# Patient Record
Sex: MALE | Race: WHITE | NOT HISPANIC OR LATINO | Employment: FULL TIME | URBAN - METROPOLITAN AREA
[De-identification: names, ages, dates, MRNs, and addresses within clinical notes are randomized per-mention and may not be internally consistent; named-entity substitution may affect disease eponyms.]

---

## 2017-06-12 ENCOUNTER — HOSPITAL ENCOUNTER (EMERGENCY)
Facility: CLINIC | Age: 47
Discharge: HOME | End: 2017-06-12
Attending: EMERGENCY MEDICINE | Admitting: EMERGENCY MEDICINE
Payer: COMMERCIAL

## 2017-06-12 ENCOUNTER — APPOINTMENT (OUTPATIENT)
Dept: CT IMAGING | Facility: CLINIC | Age: 47
End: 2017-06-12
Attending: EMERGENCY MEDICINE
Payer: COMMERCIAL

## 2017-06-12 VITALS
DIASTOLIC BLOOD PRESSURE: 78 MMHG | SYSTOLIC BLOOD PRESSURE: 134 MMHG | OXYGEN SATURATION: 95 % | TEMPERATURE: 98.4 F | RESPIRATION RATE: 16 BRPM | HEART RATE: 86 BPM

## 2017-06-12 DIAGNOSIS — J18.9 PNEUMONIA OF LEFT LOWER LOBE DUE TO INFECTIOUS ORGANISM: ICD-10-CM

## 2017-06-12 DIAGNOSIS — R91.8 PULMONARY NODULES: ICD-10-CM

## 2017-06-12 DIAGNOSIS — R07.1 PAINFUL RESPIRATION: ICD-10-CM

## 2017-06-12 LAB
ALBUMIN SERPL-MCNC: 3.6 G/DL (ref 3.4–5)
ALBUMIN UR-MCNC: NEGATIVE MG/DL
ALP SERPL-CCNC: 109 U/L (ref 40–150)
ALT SERPL W P-5'-P-CCNC: 74 U/L (ref 0–70)
ANION GAP SERPL CALCULATED.3IONS-SCNC: 5 MMOL/L (ref 3–14)
APPEARANCE UR: CLEAR
AST SERPL W P-5'-P-CCNC: 41 U/L (ref 0–45)
BASOPHILS # BLD AUTO: 0 10E9/L (ref 0–0.2)
BASOPHILS NFR BLD AUTO: 0.3 %
BILIRUB SERPL-MCNC: 2.2 MG/DL (ref 0.2–1.3)
BILIRUB UR QL STRIP: NEGATIVE
BUN SERPL-MCNC: 14 MG/DL (ref 7–30)
CALCIUM SERPL-MCNC: 8.6 MG/DL (ref 8.5–10.1)
CHLORIDE SERPL-SCNC: 104 MMOL/L (ref 94–109)
CO2 SERPL-SCNC: 29 MMOL/L (ref 20–32)
COLOR UR AUTO: YELLOW
CREAT SERPL-MCNC: 1.11 MG/DL (ref 0.66–1.25)
D DIMER PPP FEU-MCNC: 0.5 UG/ML FEU (ref 0–0.5)
DIFFERENTIAL METHOD BLD: NORMAL
EOSINOPHIL # BLD AUTO: 0.1 10E9/L (ref 0–0.7)
EOSINOPHIL NFR BLD AUTO: 0.6 %
ERYTHROCYTE [DISTWIDTH] IN BLOOD BY AUTOMATED COUNT: 13.2 % (ref 10–15)
GFR SERPL CREATININE-BSD FRML MDRD: 71 ML/MIN/1.7M2
GLUCOSE SERPL-MCNC: 130 MG/DL (ref 70–99)
GLUCOSE UR STRIP-MCNC: 50 MG/DL
HCT VFR BLD AUTO: 42.9 % (ref 40–53)
HGB BLD-MCNC: 15.6 G/DL (ref 13.3–17.7)
HGB UR QL STRIP: NEGATIVE
IMM GRANULOCYTES # BLD: 0 10E9/L (ref 0–0.4)
IMM GRANULOCYTES NFR BLD: 0.4 %
KETONES UR STRIP-MCNC: 20 MG/DL
LEUKOCYTE ESTERASE UR QL STRIP: NEGATIVE
LIPASE SERPL-CCNC: 123 U/L (ref 73–393)
LYMPHOCYTES # BLD AUTO: 2 10E9/L (ref 0.8–5.3)
LYMPHOCYTES NFR BLD AUTO: 21.9 %
MCH RBC QN AUTO: 32.2 PG (ref 26.5–33)
MCHC RBC AUTO-ENTMCNC: 36.4 G/DL (ref 31.5–36.5)
MCV RBC AUTO: 89 FL (ref 78–100)
MONOCYTES # BLD AUTO: 0.6 10E9/L (ref 0–1.3)
MONOCYTES NFR BLD AUTO: 6.5 %
MUCOUS THREADS #/AREA URNS LPF: PRESENT /LPF
NEUTROPHILS # BLD AUTO: 6.5 10E9/L (ref 1.6–8.3)
NEUTROPHILS NFR BLD AUTO: 70.3 %
NITRATE UR QL: NEGATIVE
NRBC # BLD AUTO: 0 10*3/UL
NRBC BLD AUTO-RTO: 0 /100
PH UR STRIP: 6 PH (ref 5–7)
PLATELET # BLD AUTO: 222 10E9/L (ref 150–450)
POTASSIUM SERPL-SCNC: 3.5 MMOL/L (ref 3.4–5.3)
PROT SERPL-MCNC: 7.3 G/DL (ref 6.8–8.8)
RBC # BLD AUTO: 4.85 10E12/L (ref 4.4–5.9)
RBC #/AREA URNS AUTO: <1 /HPF (ref 0–2)
SODIUM SERPL-SCNC: 138 MMOL/L (ref 133–144)
SP GR UR STRIP: 1.03 (ref 1–1.03)
TROPONIN I SERPL-MCNC: NORMAL UG/L (ref 0–0.04)
URN SPEC COLLECT METH UR: ABNORMAL
UROBILINOGEN UR STRIP-MCNC: 4 MG/DL (ref 0–2)
WBC # BLD AUTO: 9.3 10E9/L (ref 4–11)
WBC #/AREA URNS AUTO: <1 /HPF (ref 0–2)

## 2017-06-12 PROCEDURE — 74177 CT ABD & PELVIS W/CONTRAST: CPT

## 2017-06-12 PROCEDURE — 83690 ASSAY OF LIPASE: CPT | Performed by: EMERGENCY MEDICINE

## 2017-06-12 PROCEDURE — 96361 HYDRATE IV INFUSION ADD-ON: CPT

## 2017-06-12 PROCEDURE — 25000128 H RX IP 250 OP 636: Performed by: EMERGENCY MEDICINE

## 2017-06-12 PROCEDURE — 96374 THER/PROPH/DIAG INJ IV PUSH: CPT

## 2017-06-12 PROCEDURE — 80053 COMPREHEN METABOLIC PANEL: CPT | Performed by: EMERGENCY MEDICINE

## 2017-06-12 PROCEDURE — 71260 CT THORAX DX C+: CPT

## 2017-06-12 PROCEDURE — 84484 ASSAY OF TROPONIN QUANT: CPT | Performed by: EMERGENCY MEDICINE

## 2017-06-12 PROCEDURE — 81001 URINALYSIS AUTO W/SCOPE: CPT | Performed by: EMERGENCY MEDICINE

## 2017-06-12 PROCEDURE — 25000132 ZZH RX MED GY IP 250 OP 250 PS 637: Performed by: EMERGENCY MEDICINE

## 2017-06-12 PROCEDURE — 25000125 ZZHC RX 250: Performed by: EMERGENCY MEDICINE

## 2017-06-12 PROCEDURE — 85025 COMPLETE CBC W/AUTO DIFF WBC: CPT | Performed by: EMERGENCY MEDICINE

## 2017-06-12 PROCEDURE — 85379 FIBRIN DEGRADATION QUANT: CPT | Performed by: EMERGENCY MEDICINE

## 2017-06-12 PROCEDURE — 93005 ELECTROCARDIOGRAM TRACING: CPT

## 2017-06-12 PROCEDURE — 99285 EMERGENCY DEPT VISIT HI MDM: CPT | Mod: 25

## 2017-06-12 RX ORDER — FENTANYL CITRATE 50 UG/ML
50 INJECTION, SOLUTION INTRAMUSCULAR; INTRAVENOUS ONCE
Status: COMPLETED | OUTPATIENT
Start: 2017-06-12 | End: 2017-06-12

## 2017-06-12 RX ORDER — HYDROCODONE BITARTRATE AND ACETAMINOPHEN 5; 325 MG/1; MG/1
1-2 TABLET ORAL EVERY 6 HOURS PRN
Qty: 12 TABLET | Refills: 0 | Status: SHIPPED | OUTPATIENT
Start: 2017-06-12 | End: 2020-11-15

## 2017-06-12 RX ORDER — HYDROCODONE BITARTRATE AND ACETAMINOPHEN 5; 325 MG/1; MG/1
2 TABLET ORAL ONCE
Status: COMPLETED | OUTPATIENT
Start: 2017-06-12 | End: 2017-06-12

## 2017-06-12 RX ORDER — AZITHROMYCIN 250 MG/1
TABLET, FILM COATED ORAL
Qty: 6 TABLET | Refills: 0 | Status: SHIPPED | OUTPATIENT
Start: 2017-06-12 | End: 2017-07-06

## 2017-06-12 RX ORDER — IOPAMIDOL 755 MG/ML
500 INJECTION, SOLUTION INTRAVASCULAR ONCE
Status: COMPLETED | OUTPATIENT
Start: 2017-06-12 | End: 2017-06-12

## 2017-06-12 RX ORDER — KETOROLAC TROMETHAMINE 15 MG/ML
15 INJECTION, SOLUTION INTRAMUSCULAR; INTRAVENOUS ONCE
Status: DISCONTINUED | OUTPATIENT
Start: 2017-06-12 | End: 2017-06-13 | Stop reason: HOSPADM

## 2017-06-12 RX ORDER — FENTANYL CITRATE 50 UG/ML
25 INJECTION, SOLUTION INTRAMUSCULAR; INTRAVENOUS ONCE
Status: DISCONTINUED | OUTPATIENT
Start: 2017-06-12 | End: 2017-06-12

## 2017-06-12 RX ADMIN — SODIUM CHLORIDE 1000 ML: 9 INJECTION, SOLUTION INTRAVENOUS at 20:36

## 2017-06-12 RX ADMIN — IOPAMIDOL 67 ML: 755 INJECTION, SOLUTION INTRAVENOUS at 21:39

## 2017-06-12 RX ADMIN — FENTANYL CITRATE 50 MCG: 50 INJECTION INTRAMUSCULAR; INTRAVENOUS at 20:36

## 2017-06-12 RX ADMIN — SODIUM CHLORIDE 89 ML: 9 INJECTION, SOLUTION INTRAVENOUS at 21:40

## 2017-06-12 RX ADMIN — HYDROCODONE BITARTRATE AND ACETAMINOPHEN 2 TABLET: 5; 325 TABLET ORAL at 20:19

## 2017-06-12 NOTE — ED AVS SNAPSHOT
[facility] Emergency Department    [address]    [facility] [address]    Phone:  [phone]    Fax:  [phone]                                       Dakotah Zheng   MRN: [id number]    Department:  [facility] Emergency Department   Date of Visit:  6/12/2017           After Visit Summary Signature Page     I have received my discharge instructions, and my questions have been answered. I have discussed any challenges I see with this plan with the nurse or doctor.    ..........................................................................................................................................  Patient/Patient Representative Signature      ..........................................................................................................................................  Patient Representative Print Name and Relationship to Patient    ..................................................               ................................................  Date                                            Time    ..........................................................................................................................................  Reviewed by Signature/Title    ...................................................              ..............................................  Date                                                            Time

## 2017-06-12 NOTE — ED NOTES
Patient comes in for evaluation of pain in the left ribs which started on Wednesday. He denies any injury. Has been taking advil at home and it helps some but it wears off. ABCs intact.

## 2017-06-12 NOTE — ED AVS SNAPSHOT
[facility] Emergency Department    [address] [place] [address]    Phone:  [phone]    Fax:  [phone]                                       Dakotah Zheng   MRN: [id number]    Department:  [facility] Emergency Department   Date of Visit:  6/12/2017           Patient Information     Date Of Birth          1970        Your diagnoses for this visit were:     Painful respiration     Pneumonia of left lower lobe due to infectious organism     Pulmonary nodules        You were seen by Steve Garcia MD.        Discharge Instructions       Please make an appointment to follow up with your primary care provider in 2-3 days if not improving.      You also need to see your Primary Doctor in 3 months for a repeat Lung CT to see if the nodule in your lung is changing    Discharge Instructions  Bronchitis, Pneumonia, Bronchospasm    You were seen today for a chest infection or inflammation. If your doctor decided this was due to a bacterial infection, you may need an antibiotic. Sometimes these are caused by a virus, and then an antibiotic will not help.     Return to the Emergency Department if:    Your breathing gets much worse.    You are very weak, or feel much more ill.    You develop new symptoms, such as chest pain.    You cough up blood.    You are vomiting enough that you can t keep fluids or your medicine down.    What can I do to help myself?    Fill any prescriptions the doctor gave you and take them right away--especially antibiotics. Be sure to finish the whole antibiotic prescription.    You may be given a prescription for an inhaler, which can help loosen tight air passages.  Use this as needed, but not more often than directed. Inhalers work much better when used with a spacer.     You may be given a prescription for a steroid to reduce inflammation. Used long-term, these can have many serious side effects, but for short courses these do not  "happen. You may notice restlessness or increased appetite.        You may use non-prescription cough or cold medicines. Cough medicines may help, but don t make the cough go away completely.     Avoid smoke, because this can make your symptoms worse. If you smoke, this may be a good time to quit! Consider using nicotine lozenges, gum, or patches to reduce cravings.     If you have a fever, Tylenol  (acetaminophen), Motrin  (ibuprofen), or Advil  (ibuprofen) may help bring fever down and may help you feel more comfortable. Be sure to read and follow the package directions, and ask your doctor if you have questions.    Be sure to get your flu shot each year.  The pneumonia shot can help prevent pneumonia.  Probiotics: If you have been given an antibiotic, you may want to also take a probiotic pill or eat yogurt with live cultures. Probiotics have \"good bacteria\" to help your intestines stay healthy. Studies have shown that probiotics help prevent diarrhea and other intestine problems (including C. diff infection) when you take antibiotics. You can buy these without a prescription in the pharmacy section of the store.     If your doctor has told you to follow-up at your clinic, be sure to call right away and go to your appointment.  If there is any problem with keeping your appointment, call your doctor or return to the Emergency Department.    If you were given a prescription for medicine here today, be sure to read all of the information (including the package insert) that comes with your prescription.  This will include important information about the medicine, its side effects, and any warnings that you need to know about.  The pharmacist who fills the prescription can provide more information and answer questions you may have about the medicine.  If you have questions or concerns that the pharmacist cannot address, please call or return to the Emergency Department.       Remember that you can always come back to " the Emergency Department if you are not able to see your regular doctor in the amount of time listed above, if you get any new symptoms, or if there is anything that worries you.        Discharge Instructions  Chest Pain    You have been seen today for chest pain or discomfort.  At this time, your doctor has found no signs that your chest pain is due to a serious or life-threatening condition, (or you have declined more testing and/or admission to the hospital). However, sometimes there is a serious problem that does not show up right away. Your evaluation today may not be complete and you may need further testing and evaluation.     You need to follow-up with your regular doctor within 3 days.    Return to the Emergency Department if:    Your chest pain changes, gets worse, starts to happen more often, or comes with less activity.    You are short of breath.    You get very weak or tired.    You pass out or faint.    You have any new symptoms, like fever, cough, numb legs, or you cough up blood.    You have anything else that worries you.    Until you follow-up with your regular doctor please do the following:    Take one aspirin daily unless you have an allergy or are told not to by your doctor.    If a stress test appointment has been made, go to the appointment.    If you have questions, contact your regular doctor.    If your doctor today has told you to follow-up with your regular doctor, it is very important that you make an appointment with your clinic and go to the appointment.  If you do not follow-up with your primary doctor, it may result in missing an important development which could result in permanent injury or disability and/or lasting pain.  If there is any problem keeping your appointment, call your doctor or return to the Emergency Department.    If you were given a prescription for medicine here today, be sure to read all of the information (including the package insert) that comes with your  prescription.  This will include important information about the medicine, its side effects, and any warnings that you need to know about.  The pharmacist who fills the prescription can provide more information and answer questions you may have about the medicine.  If you have questions or concerns that the pharmacist cannot address, please call or return to the Emergency Department.     Opioid Medication Information    Pain medications are among the most commonly prescribed medicines, so we are including this information for all our patients. If you did not receive pain medication or get a prescription for pain medicine, you can ignore it.     You may have been given a prescription for an opioid (narcotic) pain medicine and/or have received a pain medicine while here in the Emergency Department. These medicines can make you drowsy or impaired. You must not drive, operate dangerous equipment, or engage in any other dangerous activities while taking these medications. If you drive while taking these medications, you could be arrested for DUI, or driving under the influence. Do not drink any alcohol while you are taking these medications.     Opioid pain medications can cause addiction. If you have a history of chemical dependency of any type, you are at a higher risk of becoming addicted to pain medications.  Only take these prescribed medications to treat your pain when all other options have been tried. Take it for as short a time and as few doses as possible. Store your pain pills in a secure place, as they are frequently stolen and provide a dangerous opportunity for children or visitors in your house to start abusing these powerful medications. We will not replace any lost or stolen medicine.  As soon as your pain is better, you should flush all your remaining medication.     Many prescription pain medications contain Tylenol  (acetaminophen), including Vicodin , Tylenol #3 , Norco , Lortab , and Percocet .  You  should not take any extra pills of Tylenol  if you are using these prescription medications or you can get very sick.  Do not ever take more than 3000 mg of acetaminophen in any 24 hour period.    All opioids tend to cause constipation. Drink plenty of water and eat foods that have a lot of fiber, such as fruits, vegetables, prune juice, apple juice and high fiber cereal.  Take a laxative if you don t move your bowels at least every other day. Miralax , Milk of Magnesia, Colace , or Senna  can be used to keep you regular.      Remember that you can always come back to the Emergency Department if you are not able to see your regular doctor in the amount of time listed above, if you get any new symptoms, or if there is anything that worries you.          24 Hour Appointment Hotline       To make an appointment at any [facility], call [phone] ([phone]). If you don't have a family doctor or clinic, we will help you find one. [facility] clinics are conveniently located to serve the needs of you and your family.             Review of your medicines      START taking        Dose / Directions Last dose taken    amoxicillin-clavulanate 875-125 MG per tablet   Commonly known as:  AUGMENTIN   Dose:  1 tablet   Quantity:  14 tablet        Take 1 tablet by mouth 2 times daily for 7 days   Refills:  0        azithromycin 250 MG tablet   Commonly known as:  ZITHROMAX Z-MARK   Quantity:  6 tablet        Two tablets on the first day, then one tablet daily for the next 4 days   Refills:  0        HYDROcodone-acetaminophen 5-325 MG per tablet   Commonly known as:  NORCO   Dose:  1-2 tablet   Quantity:  12 tablet        Take 1-2 tablets by mouth every 6 hours as needed for pain   Refills:  0          Our records show that you are taking the medicines listed below. If these are incorrect, please call your family doctor or clinic.        Dose / Directions Last dose taken    ADVIL PO        Refills:  0         CYCLOBENZAPRINE HCL PO        Refills:  0                Prescriptions were sent or printed at these locations (3 Prescriptions)                   Other Prescriptions                Printed at Department/Unit printer (3 of 3)         amoxicillin-clavulanate (AUGMENTIN) 875-125 MG per tablet               azithromycin (ZITHROMAX Z-MARK) 250 MG tablet               HYDROcodone-acetaminophen (NORCO) 5-325 MG per tablet                Procedures and tests performed during your visit     CBC with platelets differential    CT Chest/Abdomen/Pelvis w Contrast    Comprehensive metabolic panel    D dimer quantitative    EKG 12-lead, tracing only    Lipase    Troponin I    UA with Microscopic      Orders Needing Specimen Collection     None      Pending Results     No orders found from 6/10/2017 to 6/13/2017.            Pending Culture Results     No orders found from 6/10/2017 to 6/13/2017.            Pending Results Instructions     If you had any lab results that were not finalized at the time of your Discharge, you can call the ED Lab Result RN at [phone]. You will be contacted by this team for any positive Lab results or changes in treatment. The nurses are available 7 days a week from 10A to 6:30P.  You can leave a message 24 hours per day and they will return your call.        Test Results From Your Hospital Stay        6/12/2017  8:59 PM      Component Results     Component Value Ref Range & Units Status    WBC 9.3 4.0 - 11.0 10e9/L Final    RBC Count 4.85 4.4 - 5.9 10e12/L Final    Hemoglobin 15.6 13.3 - 17.7 g/dL Final    Hematocrit 42.9 40.0 - 53.0 % Final    MCV 89 78 - 100 fl Final    MCH 32.2 26.5 - 33.0 pg Final    MCHC 36.4 31.5 - 36.5 g/dL Final    RDW 13.2 10.0 - 15.0 % Final    Platelet Count 222 150 - 450 10e9/L Final    Diff Method Automated Method  Final    % Neutrophils 70.3 % Final    % Lymphocytes 21.9 % Final    % Monocytes 6.5 % Final    % Eosinophils 0.6 % Final    % Basophils 0.3 % Final    %  Immature Granulocytes 0.4 % Final    Nucleated RBCs 0 0 /100 Final    Absolute Neutrophil 6.5 1.6 - 8.3 10e9/L Final    Absolute Lymphocytes 2.0 0.8 - 5.3 10e9/L Final    Absolute Monocytes 0.6 0.0 - 1.3 10e9/L Final    Absolute Eosinophils 0.1 0.0 - 0.7 10e9/L Final    Absolute Basophils 0.0 0.0 - 0.2 10e9/L Final    Abs Immature Granulocytes 0.0 0 - 0.4 10e9/L Final    Absolute Nucleated RBC 0.0  Final         6/12/2017  8:38 PM      Component Results     Component Value Ref Range & Units Status    Sodium 138 133 - 144 mmol/L Final    Potassium 3.5 3.4 - 5.3 mmol/L Final    Chloride 104 94 - 109 mmol/L Final    Carbon Dioxide 29 20 - 32 mmol/L Final    Anion Gap 5 3 - 14 mmol/L Final    Glucose 130 (H) 70 - 99 mg/dL Final    Urea Nitrogen 14 7 - 30 mg/dL Final    Creatinine 1.11 0.66 - 1.25 mg/dL Final    GFR Estimate 71 >60 mL/min/1.7m2 Final    Non  GFR Calc    GFR Estimate If Black 86 >60 mL/min/1.7m2 Final    African American GFR Calc    Calcium 8.6 8.5 - 10.1 mg/dL Final    Bilirubin Total 2.2 (H) 0.2 - 1.3 mg/dL Final    Albumin 3.6 3.4 - 5.0 g/dL Final    Protein Total 7.3 6.8 - 8.8 g/dL Final    Alkaline Phosphatase 109 40 - 150 U/L Final    ALT 74 (H) 0 - 70 U/L Final    AST 41 0 - 45 U/L Final         6/12/2017  8:38 PM      Component Results     Component Value Ref Range & Units Status    Lipase 123 73 - 393 U/L Final         6/12/2017  8:40 PM      Component Results     Component Value Ref Range & Units Status    Troponin I ES  0.000 - 0.045 ug/L Final    <0.015  The 99th percentile for upper reference range is 0.045 ug/L.  Troponin values in   the range of 0.045 - 0.120 ug/L may be associated with risks of adverse   clinical events.           6/12/2017  8:28 PM      Component Results     Component Value Ref Range & Units Status    D Dimer 0.5 0.0 - 0.50 ug/ml FEU Final    This D-dimer assay is intended for use in conjuntion with a clinical pretest   probability assessment model to  exclude pulmonary embolism (PE) and as an aid   in the diagnosis of deep venous thrombosis (DVT) in outpatients suspected of   PE   or DVT. The cut-off value is 0.5 g/mL FEU.           6/12/2017 10:13 PM      Component Results     Component Value Ref Range & Units Status    Color Urine Yellow  Final    Appearance Urine Clear  Final    Glucose Urine 50 (A) NEG mg/dL Final    Bilirubin Urine Negative NEG Final    Ketones Urine 20 (A) NEG mg/dL Final    Specific Gravity Urine 1.026 1.003 - 1.035 Final    Blood Urine Negative NEG Final    pH Urine 6.0 5.0 - 7.0 pH Final    Protein Albumin Urine Negative NEG mg/dL Final    Urobilinogen mg/dL 4.0 (H) 0.0 - 2.0 mg/dL Final    Nitrite Urine Negative NEG Final    Leukocyte Esterase Urine Negative NEG Final    Source Midstream Urine  Final    WBC Urine <1 0 - 2 /HPF Final    RBC Urine <1 0 - 2 /HPF Final    Mucous Urine Present (A) NEG /LPF Final         6/12/2017  9:57 PM      Narrative     CT CHEST/ABDOMEN/PELVIS W CONTRAST 6/12/2017 9:49 PM    HISTORY: Right lower chest and flank pain.    COMPARISON: None.    TECHNIQUE:  Chest, abdomen and pelvis CT was performed following  intravenous administration of 67 cc Isovue-370.  Radiation dose for  this scan was reduced using automated exposure control, adjustment of  the mA and/or kV according to patient size, or iterative  reconstruction technique.    FINDINGS:     CHEST: No pulmonary embolus. No evidence of aortic dissection. Mild  cardiomegaly. No adenopathy. 1.1 cm pulmonary nodule in the left lung  (image 58, series 6). Lingular atelectasis. Patchy left basilar  consolidation. No significant pleural effusion.    ABDOMEN: Liver, gallbladder, spleen, pancreas, adrenal glands and  right kidney are unremarkable. Left kidney is atrophic.    Pelvis: Normal caliber bowel. Colonic diverticulosis without  diverticulitis. Appendix is visualized and normal. No free air. No  free or loculated intraperitoneal fluid collection. Urinary  bladder is  distended with normal wall thickness.        Impression     IMPRESSION:   1. No acute vascular abnormality.  2. Left lung pulmonary nodule measures 1.1 cm. Follow-up chest CT is  recommended in 3 months.  3. Patchy left basilar consolidation may represent atelectasis or  pneumonia.  4. No acute abnormality in the abdomen or pelvis.    PER BARBA MD                Clinical Quality Measure: Blood Pressure Screening     Your blood pressure was checked while you were in the emergency department today. The last reading we obtained was  BP: 137/90 . Please read the guidelines below about what these numbers mean and what you should do about them.  If your systolic blood pressure (the top number) is less than 120 and your diastolic blood pressure (the bottom number) is less than 80, then your blood pressure is normal. There is nothing more that you need to do about it.  If your systolic blood pressure (the top number) is 120-139 or your diastolic blood pressure (the bottom number) is 80-89, your blood pressure may be higher than it should be. You should have your blood pressure rechecked within a year by a primary care provider.  If your systolic blood pressure (the top number) is 140 or greater or your diastolic blood pressure (the bottom number) is 90 or greater, you may have high blood pressure. High blood pressure is treatable, but if left untreated over time it can put you at risk for heart attack, stroke, or kidney failure. You should have your blood pressure rechecked by a primary care provider within the next 4 weeks.  If your provider in the emergency department today gave you specific instructions to follow-up with your doctor or provider even sooner than that, you should follow that instruction and not wait for up to 4 weeks for your follow-up visit.        Thank you for choosing [facility]       Thank you for choosing [facility] for your care. Our goal is always to provide you with excellent care.  "Hearing back from our patients is one way we can continue to improve our services. Please take a few minutes to complete the written survey that you may receive in the mail after you visit with us. Thank you!        [employer] Information     [employer] lets you send messages to your doctor, view your test results, renew your prescriptions, schedule appointments and more. To sign up, go to [email or web address] . Click on \"Log in\" on the left side of the screen, which will take you to the Welcome page. Then click on \"Sign up Now\" on the right side of the page.     You will be asked to enter the access code listed below, as well as some personal information. Please follow the directions to create your username and password.     Your access code is: [email or web address]  Expires: 9/10/2017 10:40 PM     Your access code will  in 90 days. If you need help or a new code, please call your [facility] clinic or [phone].        Care EveryWhere ID     This is your Care EveryWhere ID. This could be used by other organizations to access your [facility] medical records  [id number]        After Visit Summary       This is your record. Keep this with you and show to your community pharmacist(s) and doctor(s) at your next visit.                  "

## 2017-06-12 NOTE — LETTER
[facility] EMERGENCY DEPARTMENT  [address]  [facility] [address]  [phone]    2017    Dakotah Zheng  [address]  [place] [address]  [phone] (home) none (work)    : 1970      To Whom it may concern:    Dakotah Zheng was seen in our Emergency Department today, 2017.  I expect his condition to improve over the next 2 days.  He may return to work/school when improved.    Sincerely,    [facility]

## 2017-06-13 LAB
INTERPRETATION ECG - MUSE: NORMAL
INTERPRETATION ECG - MUSE: NORMAL

## 2017-06-13 NOTE — ED NOTES
Patient states worse of pain waves comes and goes. Pt holding left rib and holding breath. Pt states pain currently 10/10. MD advised.

## 2017-06-13 NOTE — DISCHARGE INSTRUCTIONS
Please make an appointment to follow up with your primary care provider in 2-3 days if not improving.      You also need to see your Primary Doctor in 3 months for a repeat Lung CT to see if the nodule in your lung is changing    Discharge Instructions  Bronchitis, Pneumonia, Bronchospasm    You were seen today for a chest infection or inflammation. If your doctor decided this was due to a bacterial infection, you may need an antibiotic. Sometimes these are caused by a virus, and then an antibiotic will not help.     Return to the Emergency Department if:    Your breathing gets much worse.    You are very weak, or feel much more ill.    You develop new symptoms, such as chest pain.    You cough up blood.    You are vomiting enough that you can t keep fluids or your medicine down.    What can I do to help myself?    Fill any prescriptions the doctor gave you and take them right away--especially antibiotics. Be sure to finish the whole antibiotic prescription.    You may be given a prescription for an inhaler, which can help loosen tight air passages.  Use this as needed, but not more often than directed. Inhalers work much better when used with a spacer.     You may be given a prescription for a steroid to reduce inflammation. Used long-term, these can have many serious side effects, but for short courses these do not happen. You may notice restlessness or increased appetite.        You may use non-prescription cough or cold medicines. Cough medicines may help, but don t make the cough go away completely.     Avoid smoke, because this can make your symptoms worse. If you smoke, this may be a good time to quit! Consider using nicotine lozenges, gum, or patches to reduce cravings.     If you have a fever, Tylenol  (acetaminophen), Motrin  (ibuprofen), or Advil  (ibuprofen) may help bring fever down and may help you feel more comfortable. Be sure to read and follow the package directions, and ask your doctor if you have  "questions.    Be sure to get your flu shot each year.  The pneumonia shot can help prevent pneumonia.  Probiotics: If you have been given an antibiotic, you may want to also take a probiotic pill or eat yogurt with live cultures. Probiotics have \"good bacteria\" to help your intestines stay healthy. Studies have shown that probiotics help prevent diarrhea and other intestine problems (including C. diff infection) when you take antibiotics. You can buy these without a prescription in the pharmacy section of the store.     If your doctor has told you to follow-up at your clinic, be sure to call right away and go to your appointment.  If there is any problem with keeping your appointment, call your doctor or return to the Emergency Department.    If you were given a prescription for medicine here today, be sure to read all of the information (including the package insert) that comes with your prescription.  This will include important information about the medicine, its side effects, and any warnings that you need to know about.  The pharmacist who fills the prescription can provide more information and answer questions you may have about the medicine.  If you have questions or concerns that the pharmacist cannot address, please call or return to the Emergency Department.       Remember that you can always come back to the Emergency Department if you are not able to see your regular doctor in the amount of time listed above, if you get any new symptoms, or if there is anything that worries you.        Discharge Instructions  Chest Pain    You have been seen today for chest pain or discomfort.  At this time, your doctor has found no signs that your chest pain is due to a serious or life-threatening condition, (or you have declined more testing and/or admission to the hospital). However, sometimes there is a serious problem that does not show up right away. Your evaluation today may not be complete and you may need " further testing and evaluation.     You need to follow-up with your regular doctor within 3 days.    Return to the Emergency Department if:    Your chest pain changes, gets worse, starts to happen more often, or comes with less activity.    You are short of breath.    You get very weak or tired.    You pass out or faint.    You have any new symptoms, like fever, cough, numb legs, or you cough up blood.    You have anything else that worries you.    Until you follow-up with your regular doctor please do the following:    Take one aspirin daily unless you have an allergy or are told not to by your doctor.    If a stress test appointment has been made, go to the appointment.    If you have questions, contact your regular doctor.    If your doctor today has told you to follow-up with your regular doctor, it is very important that you make an appointment with your clinic and go to the appointment.  If you do not follow-up with your primary doctor, it may result in missing an important development which could result in permanent injury or disability and/or lasting pain.  If there is any problem keeping your appointment, call your doctor or return to the Emergency Department.    If you were given a prescription for medicine here today, be sure to read all of the information (including the package insert) that comes with your prescription.  This will include important information about the medicine, its side effects, and any warnings that you need to know about.  The pharmacist who fills the prescription can provide more information and answer questions you may have about the medicine.  If you have questions or concerns that the pharmacist cannot address, please call or return to the Emergency Department.     Opioid Medication Information    Pain medications are among the most commonly prescribed medicines, so we are including this information for all our patients. If you did not receive pain medication or get a  prescription for pain medicine, you can ignore it.     You may have been given a prescription for an opioid (narcotic) pain medicine and/or have received a pain medicine while here in the Emergency Department. These medicines can make you drowsy or impaired. You must not drive, operate dangerous equipment, or engage in any other dangerous activities while taking these medications. If you drive while taking these medications, you could be arrested for DUI, or driving under the influence. Do not drink any alcohol while you are taking these medications.     Opioid pain medications can cause addiction. If you have a history of chemical dependency of any type, you are at a higher risk of becoming addicted to pain medications.  Only take these prescribed medications to treat your pain when all other options have been tried. Take it for as short a time and as few doses as possible. Store your pain pills in a secure place, as they are frequently stolen and provide a dangerous opportunity for children or visitors in your house to start abusing these powerful medications. We will not replace any lost or stolen medicine.  As soon as your pain is better, you should flush all your remaining medication.     Many prescription pain medications contain Tylenol  (acetaminophen), including Vicodin , Tylenol #3 , Norco , Lortab , and Percocet .  You should not take any extra pills of Tylenol  if you are using these prescription medications or you can get very sick.  Do not ever take more than 3000 mg of acetaminophen in any 24 hour period.    All opioids tend to cause constipation. Drink plenty of water and eat foods that have a lot of fiber, such as fruits, vegetables, prune juice, apple juice and high fiber cereal.  Take a laxative if you don t move your bowels at least every other day. Miralax , Milk of Magnesia, Colace , or Senna  can be used to keep you regular.      Remember that you can always come back to the Emergency  Department if you are not able to see your regular doctor in the amount of time listed above, if you get any new symptoms, or if there is anything that worries you.

## 2017-06-13 NOTE — ED PROVIDER NOTES
[facility] Provider Note  [facility] Emergency Department  1:00 AM  6/13/2017    Dakotah Zheng  46 year oldmale    Chief Complaint   Patient presents with     Rib Pain       HPI:    46-year-old Indonesian here with left-sided chest pain intermittent over the last 5 days sharp and pressure-like in nature.  Pain comes in waves which has no identifiable pattern not tied to eating or exertion.  It is a mild cough but no fever cough is nonproductive no sore throat or sinus congestion.  No nausea vomiting diarrhea dysuria.  He has no known heart or lung disease.  No history of recent travel or calf pain or swelling.  Area of pain is located just inferior and lateral to the left breast in the anterior axillary line.    ROS: 10 point ROS completed and negative other than mentioned above    No past medical history on file.  Past Surgical History:   Procedure Laterality Date     TONSILLECTOMY ADULT  06/16/2015     Family History   Problem Relation Age of Onset     DIABETES Mother      DIABETES Maternal Grandmother      Social History     Social History     Marital status:      Spouse name: N/A     Number of children: N/A     Years of education: N/A     Occupational History     Not on file.     Social History Main Topics     Smoking status: Never Smoker     Smokeless tobacco: Not on file     Alcohol use No     Drug use: No     Sexual activity: Not on file     Other Topics Concern     Not on file     Social History Narrative     Current Facility-Administered Medications   Medication     ketorolac (TORADOL) injection 15 mg     Current Outpatient Prescriptions   Medication     CYCLOBENZAPRINE HCL PO     Ibuprofen (ADVIL PO)     amoxicillin-clavulanate (AUGMENTIN) 875-125 MG per tablet     azithromycin (ZITHROMAX Z-MARK) 250 MG tablet     HYDROcodone-acetaminophen (NORCO) 5-325 MG per tablet      No Known Allergies      Physical Exam  Vitals: /78  Pulse 86  Temp 98.4  F (36.9  C) (Temporal)  Resp 16  SpO2 95%  Gen: well  appearing for stated age and in no acute distress  HEENT: Atraumatic, conjunctiva and lids normal, external ear unremarkable, Nares clear bilaterally, mmm, oropharynx without tonsilar hypertrophy/erythema/exudate  Neck: supple, painless ROM, no cervical lymphadenopathy  Lungs:  CTAB,  no resp distress, tenderness, left lower chest wall and inferior costal margin anterior axillary line no palpable crepitus or deformity  CV: rrr, no m/r/g, ppi  Abd: soft, nontender, nondistended, no rebound/masses/guarding/hsm  Ext: no peripheral edema  Skin: warm, dry, well perused, no rashes/bruising/lesions on exposed skin  Neuro: alert, nonfocal gait stable  Psych: Normal mood, normal affect    Labs and Imaging:    See Epic for full report    Medical Decision Makin-year-old, here with intermittent pleuritic chest pain for the last 5 days localizing to the inferior costal margin.  Differential includes both cardiopulmonary and intra-abdominal etiologies.  Workup here shows no significant cardiac etiology from EKG and troponin which given his history and duration of symptoms as well as the pain being constant since this morning and a negative troponin the context of clinical suspicion makes this very unlikely to be occlusive coronary disease.  CT scan was done to evaluate for PE pneumonia as well as a left renal stone and this shows a possible left lower lobe pneumonia he is not hypoxic he is a good candidate for outpatient treatment and will treat as such.  He is comfortable and agreeable with that plan.  We also discussed the pulmonary nodule seen on CT scan of the importance of follow-up in 3 months with another CT and he was comfortable with that.    Diagnosis:    ICD-10-CM    1. Painful respiration R07.1    2. Pneumonia of left lower lobe due to infectious organism J18.9    3. Pulmonary nodules R91.8        Disposition:    Home    Steve Garcia MD  [facility]  [facility]                     Steve Garcia  MD [place]  06/13/17 0104

## 2017-06-13 NOTE — ED NOTES
Pt ABCs intact and A&Ox4. Discharge instructions completed. Prescriptions provided. Pain improved and tolerable per patient.

## 2017-07-06 ENCOUNTER — APPOINTMENT (OUTPATIENT)
Dept: ULTRASOUND IMAGING | Facility: CLINIC | Age: 47
End: 2017-07-06
Attending: EMERGENCY MEDICINE
Payer: COMMERCIAL

## 2017-07-06 ENCOUNTER — HOSPITAL ENCOUNTER (EMERGENCY)
Facility: CLINIC | Age: 47
Discharge: HOME | End: 2017-07-06
Attending: EMERGENCY MEDICINE | Admitting: EMERGENCY MEDICINE
Payer: COMMERCIAL

## 2017-07-06 VITALS
HEIGHT: 65 IN | WEIGHT: 160 LBS | DIASTOLIC BLOOD PRESSURE: 88 MMHG | TEMPERATURE: 98.3 F | RESPIRATION RATE: 16 BRPM | HEART RATE: 70 BPM | OXYGEN SATURATION: 98 % | BODY MASS INDEX: 26.66 KG/M2 | SYSTOLIC BLOOD PRESSURE: 125 MMHG

## 2017-07-06 DIAGNOSIS — R10.13 ABDOMINAL PAIN, EPIGASTRIC: ICD-10-CM

## 2017-07-06 LAB
ALBUMIN SERPL-MCNC: 4.1 G/DL (ref 3.4–5)
ALBUMIN UR-MCNC: NEGATIVE MG/DL
ALP SERPL-CCNC: 131 U/L (ref 40–150)
ALT SERPL W P-5'-P-CCNC: 102 U/L (ref 0–70)
ANION GAP SERPL CALCULATED.3IONS-SCNC: 7 MMOL/L (ref 3–14)
APPEARANCE UR: CLEAR
AST SERPL W P-5'-P-CCNC: 96 U/L (ref 0–45)
BILIRUB SERPL-MCNC: 3.3 MG/DL (ref 0.2–1.3)
BILIRUB UR QL STRIP: NEGATIVE
BUN SERPL-MCNC: 10 MG/DL (ref 7–30)
CALCIUM SERPL-MCNC: 9.1 MG/DL (ref 8.5–10.1)
CHLORIDE SERPL-SCNC: 104 MMOL/L (ref 94–109)
CO2 SERPL-SCNC: 29 MMOL/L (ref 20–32)
COLOR UR AUTO: YELLOW
CREAT SERPL-MCNC: 1.18 MG/DL (ref 0.66–1.25)
ERYTHROCYTE [DISTWIDTH] IN BLOOD BY AUTOMATED COUNT: 13.2 % (ref 10–15)
GFR SERPL CREATININE-BSD FRML MDRD: 67 ML/MIN/1.7M2
GLUCOSE SERPL-MCNC: 128 MG/DL (ref 70–99)
GLUCOSE UR STRIP-MCNC: NEGATIVE MG/DL
HCT VFR BLD AUTO: 48.6 % (ref 40–53)
HGB BLD-MCNC: 18.2 G/DL (ref 13.3–17.7)
HGB UR QL STRIP: NEGATIVE
KETONES UR STRIP-MCNC: NEGATIVE MG/DL
LEUKOCYTE ESTERASE UR QL STRIP: NEGATIVE
LIPASE SERPL-CCNC: 172 U/L (ref 73–393)
MCH RBC QN AUTO: 33 PG (ref 26.5–33)
MCHC RBC AUTO-ENTMCNC: 37.4 G/DL (ref 31.5–36.5)
MCV RBC AUTO: 88 FL (ref 78–100)
MUCOUS THREADS #/AREA URNS LPF: PRESENT /LPF
NITRATE UR QL: NEGATIVE
PH UR STRIP: 6 PH (ref 5–7)
PLATELET # BLD AUTO: 230 10E9/L (ref 150–450)
POTASSIUM SERPL-SCNC: 3.5 MMOL/L (ref 3.4–5.3)
PROT SERPL-MCNC: 7.9 G/DL (ref 6.8–8.8)
RBC # BLD AUTO: 5.52 10E12/L (ref 4.4–5.9)
RBC #/AREA URNS AUTO: 2 /HPF (ref 0–2)
SODIUM SERPL-SCNC: 140 MMOL/L (ref 133–144)
SP GR UR STRIP: 1.02 (ref 1–1.03)
URN SPEC COLLECT METH UR: ABNORMAL
UROBILINOGEN UR STRIP-MCNC: 4 MG/DL (ref 0–2)
WBC # BLD AUTO: 10 10E9/L (ref 4–11)
WBC #/AREA URNS AUTO: 1 /HPF (ref 0–2)

## 2017-07-06 PROCEDURE — 76705 ECHO EXAM OF ABDOMEN: CPT

## 2017-07-06 PROCEDURE — 25000132 ZZH RX MED GY IP 250 OP 250 PS 637: Performed by: EMERGENCY MEDICINE

## 2017-07-06 PROCEDURE — 81001 URINALYSIS AUTO W/SCOPE: CPT | Performed by: EMERGENCY MEDICINE

## 2017-07-06 PROCEDURE — 25000125 ZZHC RX 250: Performed by: EMERGENCY MEDICINE

## 2017-07-06 PROCEDURE — 80053 COMPREHEN METABOLIC PANEL: CPT | Performed by: EMERGENCY MEDICINE

## 2017-07-06 PROCEDURE — 85027 COMPLETE CBC AUTOMATED: CPT | Performed by: EMERGENCY MEDICINE

## 2017-07-06 PROCEDURE — 99284 EMERGENCY DEPT VISIT MOD MDM: CPT | Mod: 25

## 2017-07-06 PROCEDURE — 83690 ASSAY OF LIPASE: CPT | Performed by: EMERGENCY MEDICINE

## 2017-07-06 RX ADMIN — LIDOCAINE HYDROCHLORIDE 30 ML: 20 SOLUTION ORAL; TOPICAL at 17:25

## 2017-07-06 ASSESSMENT — ENCOUNTER SYMPTOMS
VOMITING: 1
ABDOMINAL PAIN: 1

## 2017-07-06 NOTE — ED AVS SNAPSHOT
[facility] Emergency Department    [address]    [facility] [address]    Phone:  [phone]    Fax:  [phone]                                       Dakotah Zheng   MRN: [id number]    Department:  [facility] Emergency Department   Date of Visit:  7/6/2017           After Visit Summary Signature Page     I have received my discharge instructions, and my questions have been answered. I have discussed any challenges I see with this plan with the nurse or doctor.    ..........................................................................................................................................  Patient/Patient Representative Signature      ..........................................................................................................................................  Patient Representative Print Name and Relationship to Patient    ..................................................               ................................................  Date                                            Time    ..........................................................................................................................................  Reviewed by Signature/Title    ...................................................              ..............................................  Date                                                            Time

## 2017-07-06 NOTE — ED PROVIDER NOTES
"  History     Chief Complaint:  Abdominal Pain    HPI   Dakotah Zheng is a 46 year old male who presents to the emergency department today for evaluation of abdominal pain. The patient reports that when he woke up this morning he felt completely normal. At 1030 he ate a sandwich and then at 1230 he suddenly developed abdominal pain. The patient reports that he vomited three times at 1330 and at 1430 he took half a tablet of Vicodin and this relieved his pain slightly. The patient reports that he has had abdominal pain similar to this in the past, the last time being in the spring of 2016. He still has his gallbladder. The patient notes that he drinks a lot of Diet Coke.     Allergies:  No Known Drug Allergies    Medications:    Ibuprofen  Norco    Past Medical History:    History reviewed. No pertinent past medical history.    Past Surgical History:    Tonsillectomy     Family History:    Mother: Diabetes   Maternal Grandmother: Diabetes     Social History:  Smoking Status: Never Smoker  Alcohol Use: Negative   Marital Status:   [2]     Review of Systems   Gastrointestinal: Positive for abdominal pain and vomiting.   All other systems reviewed and are negative.    Physical Exam   Vitals:  Patient Vitals for the past 24 hrs:   BP Temp Temp src Pulse Heart Rate Resp SpO2 Height Weight   07/06/17 1840 - - - - - - 95 % - -   07/06/17 1836 - - - - - - 95 % - -   07/06/17 1815 - - - - - - 96 % - -   07/06/17 1800 - - - - - - 96 % - -   07/06/17 1744 - - - - - - 97 % - -   07/06/17 1742 - - - - - - 97 % - -   07/06/17 1740 - - - - - - 96 % - -   07/06/17 1721 - - - - - - 97 % - -   07/06/17 1720 - - - - - - 97 % - -   07/06/17 1717 - - - - - - 98 % - -   07/06/17 1716 (!) 136/91 - - - - - - - -   07/06/17 1536 141/90 98.3  F (36.8  C) Oral 70 70 16 97 % 1.651 m (5' 5\") 72.6 kg (160 lb)      Physical Exam  General: Patient is alert and interactive when I enter the room  Head:  The scalp, face, and head appear " normal  Eyes:  The pupils are equal, round, and reactive to light    Conjunctivae and sclerae are normal  ENT:    External acoustic canals are normal    The oropharynx is normal without erythema.     Uvula is in the midline  Neck:  Normal range of motion  CV:  Regular rate. S1/S2. No murmurs.   Resp:  Lungs are clear without wheezes or rales. No distress  GI:  Epigastric tenderness no rebound and no guarding     No distension.    MS:  Normal tone. Joints grossly normal without effusions.     No asymmetric leg swelling, calf or thigh tenderness.      Normal motor assessment of all extremities.  Skin:  No rash or lesions noted. Normal capillary refill noted  Neuro:  Speech is normal and fluent. Face is symmetric.     Moving all extremities well.   Psych:  Awake. Alert.  Normal affect.  Appropriate interactions.  Lymph: No anterior cervical lymphadenopathy noted    Emergency Department Course     Imaging:  Radiology findings were communicated with the patient who voiced understanding of the findings.    US Abdomen Limited  1. Cholelithiasis.   2. Fatty liver.    Reading per radiology    Laboratory:  Laboratory findings were communicated with the patient who voiced understanding of the findings.    UA: Urobilinogen: 4.0 (H), Mucous: Present (A)  CBC: WBC 10.0, HGB 18.2 (H),   CMP: Glucose 128 (H), Bilirubin 3.3 (H),  (H), AST 96 (H) o/w WNL (Creatinine 1.18)  Lipase: 172     Component      Latest Ref Rng & Units 3/15/2016 6/23/2016 7/13/2016 6/12/2017   Sodium      133 - 144 mmol/L 137 139 140 138   Potassium      3.4 - 5.3 mmol/L 3.9 3.5 3.4 3.5   Chloride      94 - 109 mmol/L 100 104 101 104   Carbon Dioxide      20 - 32 mmol/L 34 (H) 31 31 29   Anion Gap      3 - 14 mmol/L 3 4 8 5   Glucose      70 - 99 mg/dL 183 (H) 115 (H) 130 (H) 130 (H)   Urea Nitrogen      7 - 30 mg/dL 18 8 13 14   Creatinine      0.66 - 1.25 mg/dL 1.11 1.15 1.13 1.11   GFR Estimate      >60 mL/min/1.7m2 72 69 70 71   GFR Estimate  If Black      >60 mL/min/1.7m2 87 83 85 86   Calcium      8.5 - 10.1 mg/dL 8.7 8.3 (L) 8.7 8.6   Bilirubin Total      0.2 - 1.3 mg/dL 3.3 (H) 4.4 (H) 1.4 (H) 2.2 (H)   Albumin      3.4 - 5.0 g/dL 4.2 3.5 3.9 3.6   Protein Total      6.8 - 8.8 g/dL 8.2 7.1 7.8 7.3   Alkaline Phosphatase      40 - 150 U/L 128 197 (H) 116 109   ALT      0 - 70 U/L 284 (H) 685 (HH) 91 (H) 74 (H)   AST      0 - 45 U/L 305 (H) 320 (H) 98 (H) 41     Component      Latest Ref Rng & Units 7/6/2017   Sodium      133 - 144 mmol/L 140   Potassium      3.4 - 5.3 mmol/L 3.5   Chloride      94 - 109 mmol/L 104   Carbon Dioxide      20 - 32 mmol/L 29   Anion Gap      3 - 14 mmol/L 7   Glucose      70 - 99 mg/dL 128 (H)   Urea Nitrogen      7 - 30 mg/dL 10   Creatinine      0.66 - 1.25 mg/dL 1.18   GFR Estimate      >60 mL/min/1.7m2 67   GFR Estimate If Black      >60 mL/min/1.7m2 81   Calcium      8.5 - 10.1 mg/dL 9.1   Bilirubin Total      0.2 - 1.3 mg/dL 3.3 (H)   Albumin      3.4 - 5.0 g/dL 4.1   Protein Total      6.8 - 8.8 g/dL 7.9   Alkaline Phosphatase      40 - 150 U/L 131   ALT      0 - 70 U/L 102 (H)   AST      0 - 45 U/L 96 (H)       Interventions:  1725 GI Cocktail (Maalox/Mylanta and viscous Lidocaine), 30 mL suspension, PO      Emergency Department Course:  Nursing notes and vitals reviewed.  I performed an exam of the patient as documented above.   The patient was sent for a US Abdomen Limited while in the emergency department, results above.   IV was inserted and blood was drawn for laboratory testing, results above.  The patient provided a urine sample here in the emergency department. This was sent for laboratory testing, findings above.  I discussed the treatment plan with the patient. They expressed understanding of this plan and consented to discharge. They will be discharged home with instructions for care and follow up. In addition, the patient will return to the emergency department if their symptoms persist, worsen, if new  symptoms arise or if there is any concern.  All questions were answered.  I personally reviewed the laboratory and imaging results with the patient and answered all related questions prior to discharge.  Impression & Plan      Medical Decision Making:   Dakotah Zheng is a 46 year old male who presents with abdominal pain.  He has hx of this; etiology is unclear despite extensive workups.  Hyperbili is baseline; unclear etiology but suspected Kaplan in the past. He looks overall well and without a concerning etiology of abdominal pain.  A broad differential diagnosis was of course considered.    The workup in the ED is at this point negative.  No etiology for the patients pain is found at this point and my suspicion of an intraabdominal catastrophe or other worrisome etiology is very low.   ultrasound and lab work are reassuring.    I will not therefore admit him for serial exams and further workup.  Patient is hemodynamically stable in ED. Plan is home with abdominal pain recheck by primary care physician or return to ED at anytime.  Return for fevers greater than 102, increasing pain, other new symptoms develop.  Abdominal pain handout given.  Questions were answered.       Diagnosis:    ICD-10-CM    1. Abdominal pain, epigastric R10.13      Disposition:   The patient is discharged to home.    Discharge Medications:  New Prescriptions    RANITIDINE (ZANTAC) 150 MG TABLET    Take 1 tablet (150 mg) by mouth 2 times daily for 7 days     Scribe Disclosure:  I, Devan Knox, am serving as a scribe at 5:16 PM on 7/6/2017 to document services personally performed by Behzad Bal MD, based on my observations and the provider's statements to me.    [facility] EMERGENCY DEPARTMENT       Behzad Bal MD  07/06/17 9910

## 2017-07-06 NOTE — ED AVS SNAPSHOT
[facility] Emergency Department    [address] [place] [address]    Phone:  [phone]    Fax:  [phone]                                       Dakotah Zheng   MRN: [id number]    Department:  [facility] Emergency Department   Date of Visit:  7/6/2017           Patient Information     Date Of Birth          1970        Your diagnoses for this visit were:     Abdominal pain, epigastric        You were seen by Behzad Bal MD.      Follow-up Information     Follow up with Ilda Art In 3 days.    Contact information:    [facility]  [address] [place] [address]  [phone]          Discharge Instructions         Epigastric Pain (Uncertain Cause)    Epigastric pain can be a sign of disease in the upper abdomen. Common causes include:    Acid reflux (stomach acid flowing up into the esophagus)    Gastritis (irritation of the stomach lining)    Peptic Ulcer Disease    Inflammation of the pancreas    Gallstone    Infection in the gallbladder  Pain may be dull or burning. It may spread upward to the chest or to the back. There may be other symptoms such as belching, bloating, cramps or hunger pains. There may be weight loss or poor appetite, nausea or vomiting.  Since the diagnosis of your pain is not certain yet, further tests may sometimes be needed. Sometimes the doctor will treat you for the most likely condition to see if there is improvement before doing further tests.  Home care  Medicines    Antacids help neutralize the normal acids in your stomach. Examples are Maalox, Mylanta, Rolaids, and Tums. If you don t like the liquid, you can also try a chewable one. You may find one works better than another for you. Overuse can cause diarrhea or constipation.    Acid blockers (H2 blockers) decrease acid production. Examples are cimetidine (Tagamet), famotidine (Pepcid) and ranitidine (Zantac).    Acid inhibitors (PPIs) decrease acid  production in a different way than the blockers. You may find they work better, but can take a little longer to take effect.  Examples are omeprazole (Prilosec), lansoprazole (Prevacid), pantoprazole (Protonix), rabeprazole (Aciphex), and esomeprazole (Nexium).    Take an antacid 30-60 minutes after eating and at bedtime, but not at the same time as an acid blocker.    Try not to take NSAIDs. Aspirin may also cause problems, but if taking it for your heart or other medical reasons, talk to your doctor before stopping it; you do not want to cause a worse problem, like a heart attack or stroke.  Diet    If certain foods seem to cause your spasm, try to avoid them.     Eat slowly and chew food well before swallowing. Symptoms of gastritis can be worsened by certain foods. Limit or avoid fatty, fried, and spicy foods, as well as coffee, chocolate, mint, and foods with high acid content such as tomatoes and citrus fruit and juices (orange, grapefruit, lemon).    Avoid alcohol, caffeine, and tobacco, which can delay healing and worsen your problem.    Try eating smaller meals with snacks in between  Follow-up care  Follow up with your healthcare provider or as advised.  When to seek medical advice  Call your healthcare provider right away if any of the following occur:    Stomach pain worsens or moves to the right lower part of the abdomen    Chest pain appears, or if it worsens or spreads to the chest, back, neck, shoulder, or arm    Frequent vomiting (can t keep down liquids)    Blood in the stool or vomit (red or black color)    Feeling weak or dizzy, fainting, or having trouble breathing    Fever of 100.4 F (38 C) or higher, or as directed by your healthcare provider    Abdominal swelling  Date Last Reviewed: 9/25/2015 2000-2017 [employer]. [address] [place] [address], [place] [address]. All rights reserved. This information is not intended as a substitute for professional medical care. Always follow your  healthcare professional's instructions.          24 Hour Appointment Hotline       To make an appointment at any [facility], call [phone] ([phone]). If you don't have a family doctor or clinic, we will help you find one. [place] clinics are conveniently located to serve the needs of you and your family.             Review of your medicines      START taking        Dose / Directions Last dose taken    ranitidine 150 MG tablet   Commonly known as:  ZANTAC   Dose:  150 mg   Quantity:  14 tablet        Take 1 tablet (150 mg) by mouth 2 times daily for 7 days   Refills:  0          Our records show that you are taking the medicines listed below. If these are incorrect, please call your family doctor or clinic.        Dose / Directions Last dose taken    ADVIL PO        Refills:  0        HYDROcodone-acetaminophen 5-325 MG per tablet   Commonly known as:  NORCO   Dose:  1-2 tablet   Quantity:  12 tablet        Take 1-2 tablets by mouth every 6 hours as needed for pain   Refills:  0                Prescriptions were sent or printed at these locations (1 Prescription)                   Other Prescriptions                Printed at Department/Unit printer (1 of 1)         ranitidine (ZANTAC) 150 MG tablet                Procedures and tests performed during your visit     CBC (platelets, no diff)    Comprehensive metabolic panel    Lipase    UA with Microscopic reflex to Culture    US Abdomen Limited      Orders Needing Specimen Collection     None      Pending Results     Date and Time Order Name Status Description    7/6/2017 1756 US Abdomen Limited Preliminary             Pending Culture Results     No orders found from 7/4/2017 to 7/7/2017.            Pending Results Instructions     If you had any lab results that were not finalized at the time of your Discharge, you can call the ED Lab Result RN at [phone]. You will be contacted by this team for any positive Lab results or changes in treatment.  The nurses are available 7 days a week from 10A to 6:30P.  You can leave a message 24 hours per day and they will return your call.        Test Results From Your Hospital Stay        7/6/2017  4:08 PM      Component Results     Component Value Ref Range & Units Status    WBC 10.0 4.0 - 11.0 10e9/L Final    RBC Count 5.52 4.4 - 5.9 10e12/L Final    Hemoglobin 18.2 (H) 13.3 - 17.7 g/dL Final    Hematocrit 48.6 40.0 - 53.0 % Final    MCV 88 78 - 100 fl Final    MCH 33.0 26.5 - 33.0 pg Final    MCHC 37.4 (H) 31.5 - 36.5 g/dL Final    RDW 13.2 10.0 - 15.0 % Final    Platelet Count 230 150 - 450 10e9/L Final         7/6/2017  4:26 PM      Component Results     Component Value Ref Range & Units Status    Sodium 140 133 - 144 mmol/L Final    Potassium 3.5 3.4 - 5.3 mmol/L Final    Chloride 104 94 - 109 mmol/L Final    Carbon Dioxide 29 20 - 32 mmol/L Final    Anion Gap 7 3 - 14 mmol/L Final    Glucose 128 (H) 70 - 99 mg/dL Final    Urea Nitrogen 10 7 - 30 mg/dL Final    Creatinine 1.18 0.66 - 1.25 mg/dL Final    GFR Estimate 67 >60 mL/min/1.7m2 Final    Non  GFR Calc    GFR Estimate If Black 81 >60 mL/min/1.7m2 Final    African American GFR Calc    Calcium 9.1 8.5 - 10.1 mg/dL Final    Bilirubin Total 3.3 (H) 0.2 - 1.3 mg/dL Final    Albumin 4.1 3.4 - 5.0 g/dL Final    Protein Total 7.9 6.8 - 8.8 g/dL Final    Alkaline Phosphatase 131 40 - 150 U/L Final     (H) 0 - 70 U/L Final    AST 96 (H) 0 - 45 U/L Final         7/6/2017  5:34 PM      Component Results     Component Value Ref Range & Units Status    Color Urine Yellow  Final    Appearance Urine Clear  Final    Glucose Urine Negative NEG mg/dL Final    Bilirubin Urine Negative NEG Final    Ketones Urine Negative NEG mg/dL Final    Specific Gravity Urine 1.017 1.003 - 1.035 Final    Blood Urine Negative NEG Final    pH Urine 6.0 5.0 - 7.0 pH Final    Protein Albumin Urine Negative NEG mg/dL Final    Urobilinogen mg/dL 4.0 (H) 0.0 - 2.0 mg/dL Final     Nitrite Urine Negative NEG Final    Leukocyte Esterase Urine Negative NEG Final    Source Midstream Urine  Final    WBC Urine 1 0 - 2 /HPF Final    RBC Urine 2 0 - 2 /HPF Final    Mucous Urine Present (A) NEG /LPF Final         7/6/2017  5:15 PM      Component Results     Component Value Ref Range & Units Status    Lipase 172 73 - 393 U/L Final         7/6/2017  7:05 PM      Narrative     US ABDOMEN LIMITED7/6/2017 7:00 PM     HISTORY: Abdominal pain, evaluate for cholecystitis, CBD dilation.    COMPARISON:  6/23/2016.    FINDINGS: Findings consistent with fatty liver infiltration. There is  gallbladder sludge and tiny gallstones. No wall thickening. Negative  sonographic Garcia's sign. No focal hepatic lesion or common bile duct  dilatation. The pancreas is significantly obscured. The right kidney  is unremarkable.        Impression     IMPRESSION:   1. Cholelithiasis.   2. Fatty liver.                  Clinical Quality Measure: Blood Pressure Screening     Your blood pressure was checked while you were in the emergency department today. The last reading we obtained was  BP: (!) 136/91 . Please read the guidelines below about what these numbers mean and what you should do about them.  If your systolic blood pressure (the top number) is less than 120 and your diastolic blood pressure (the bottom number) is less than 80, then your blood pressure is normal. There is nothing more that you need to do about it.  If your systolic blood pressure (the top number) is 120-139 or your diastolic blood pressure (the bottom number) is 80-89, your blood pressure may be higher than it should be. You should have your blood pressure rechecked within a year by a primary care provider.  If your systolic blood pressure (the top number) is 140 or greater or your diastolic blood pressure (the bottom number) is 90 or greater, you may have high blood pressure. High blood pressure is treatable, but if left untreated over time it can put  "you at risk for heart attack, stroke, or kidney failure. You should have your blood pressure rechecked by a primary care provider within the next 4 weeks.  If your provider in the emergency department today gave you specific instructions to follow-up with your doctor or provider even sooner than that, you should follow that instruction and not wait for up to 4 weeks for your follow-up visit.        Thank you for choosing [facility]       Thank you for choosing [facility] for your care. Our goal is always to provide you with excellent care. Hearing back from our patients is one way we can continue to improve our services. Please take a few minutes to complete the written survey that you may receive in the mail after you visit with us. Thank you!        [employer] game platform Information     [employer] lets you send messages to your doctor, view your test results, renew your prescriptions, schedule appointments and more. To sign up, go to [email or web address] . Click on \"Log in\" on the left side of the screen, which will take you to the Welcome page. Then click on \"Sign up Now\" on the right side of the page.     You will be asked to enter the access code listed below, as well as some personal information. Please follow the directions to create your username and password.     Your access code is: [email or web address]  Expires: 9/10/2017 10:40 PM     Your access code will  in 90 days. If you need help or a new code, please call your [facility] clinic or [phone].        Care EveryWhere ID     This is your Care EveryWhere ID. This could be used by other organizations to access your [facility] medical records  [id number]        Equal Access to Services     POLI JONES : Hadii annel griffin Soebonie, waaxda luqadaha, qaybta kaalmapérez tamez . So [facility] [phone].    ATENCIÓN: Si habla español, tiene a huynh disposición servicios gratuitos de asistencia lingüística. Llame al [phone].    We " comply with applicable federal civil rights laws and [place] laws. We do not discriminate on the basis of race, color, national origin, age, disability sex, sexual orientation or gender identity.            After Visit Summary       This is your record. Keep this with you and show to your community pharmacist(s) and doctor(s) at your next visit.

## 2017-07-06 NOTE — ED NOTES
Mid-abdominal pain that began today at 1230.  Vomiting x3 times at 1330.  Took Vicodin 1/2 tab at 1430 and that relieved some of the pain.  Patient alert and oriented x3.  Airway, breathing and circulation intact.

## 2017-07-07 NOTE — DISCHARGE INSTRUCTIONS
Epigastric Pain (Uncertain Cause)    Epigastric pain can be a sign of disease in the upper abdomen. Common causes include:    Acid reflux (stomach acid flowing up into the esophagus)    Gastritis (irritation of the stomach lining)    Peptic Ulcer Disease    Inflammation of the pancreas    Gallstone    Infection in the gallbladder  Pain may be dull or burning. It may spread upward to the chest or to the back. There may be other symptoms such as belching, bloating, cramps or hunger pains. There may be weight loss or poor appetite, nausea or vomiting.  Since the diagnosis of your pain is not certain yet, further tests may sometimes be needed. Sometimes the doctor will treat you for the most likely condition to see if there is improvement before doing further tests.  Home care  Medicines    Antacids help neutralize the normal acids in your stomach. Examples are Maalox, Mylanta, Rolaids, and Tums. If you don t like the liquid, you can also try a chewable one. You may find one works better than another for you. Overuse can cause diarrhea or constipation.    Acid blockers (H2 blockers) decrease acid production. Examples are cimetidine (Tagamet), famotidine (Pepcid) and ranitidine (Zantac).    Acid inhibitors (PPIs) decrease acid production in a different way than the blockers. You may find they work better, but can take a little longer to take effect.  Examples are omeprazole (Prilosec), lansoprazole (Prevacid), pantoprazole (Protonix), rabeprazole (Aciphex), and esomeprazole (Nexium).    Take an antacid 30-60 minutes after eating and at bedtime, but not at the same time as an acid blocker.    Try not to take NSAIDs. Aspirin may also cause problems, but if taking it for your heart or other medical reasons, talk to your doctor before stopping it; you do not want to cause a worse problem, like a heart attack or stroke.  Diet    If certain foods seem to cause your spasm, try to avoid them.     Eat slowly and chew food well  before swallowing. Symptoms of gastritis can be worsened by certain foods. Limit or avoid fatty, fried, and spicy foods, as well as coffee, chocolate, mint, and foods with high acid content such as tomatoes and citrus fruit and juices (orange, grapefruit, lemon).    Avoid alcohol, caffeine, and tobacco, which can delay healing and worsen your problem.    Try eating smaller meals with snacks in between  Follow-up care  Follow up with your healthcare provider or as advised.  When to seek medical advice  Call your healthcare provider right away if any of the following occur:    Stomach pain worsens or moves to the right lower part of the abdomen    Chest pain appears, or if it worsens or spreads to the chest, back, neck, shoulder, or arm    Frequent vomiting (can t keep down liquids)    Blood in the stool or vomit (red or black color)    Feeling weak or dizzy, fainting, or having trouble breathing    Fever of 100.4 F (38 C) or higher, or as directed by your healthcare provider    Abdominal swelling  Date Last Reviewed: 9/25/2015 2000-2017 [employer]. [address] [place] [address], [place] [address]. All rights reserved. This information is not intended as a substitute for professional medical care. Always follow your healthcare professional's instructions.

## 2020-11-15 ENCOUNTER — APPOINTMENT (OUTPATIENT)
Dept: GENERAL RADIOLOGY | Facility: CLINIC | Age: 50
End: 2020-11-15
Attending: EMERGENCY MEDICINE
Payer: MEDICAID

## 2020-11-15 ENCOUNTER — HOSPITAL ENCOUNTER (INPATIENT)
Facility: CLINIC | Age: 50
LOS: 7 days | Discharge: HOME | End: 2020-11-22
Attending: EMERGENCY MEDICINE | Admitting: INTERNAL MEDICINE
Payer: MEDICAID

## 2020-11-15 DIAGNOSIS — R05.9 COUGH: ICD-10-CM

## 2020-11-15 DIAGNOSIS — J12.82 PNEUMONIA DUE TO COVID-19 VIRUS: ICD-10-CM

## 2020-11-15 DIAGNOSIS — U07.1 PNEUMONIA DUE TO COVID-19 VIRUS: ICD-10-CM

## 2020-11-15 DIAGNOSIS — R06.02 SHORTNESS OF BREATH: Primary | ICD-10-CM

## 2020-11-15 LAB
ABO + RH BLD: NORMAL
ABO + RH BLD: NORMAL
ALBUMIN SERPL-MCNC: 2 G/DL (ref 3.4–5)
ALP SERPL-CCNC: 193 U/L (ref 40–150)
ALT SERPL W P-5'-P-CCNC: 79 U/L (ref 0–70)
ANION GAP SERPL CALCULATED.3IONS-SCNC: 6 MMOL/L (ref 3–14)
APTT PPP: 28 SEC (ref 22–37)
AST SERPL W P-5'-P-CCNC: 96 U/L (ref 0–45)
BASE EXCESS BLDV CALC-SCNC: 3.3 MMOL/L
BASOPHILS # BLD AUTO: 0.1 10E9/L (ref 0–0.2)
BASOPHILS NFR BLD AUTO: 1 %
BILIRUB DIRECT SERPL-MCNC: 0.3 MG/DL (ref 0–0.2)
BILIRUB SERPL-MCNC: 0.9 MG/DL (ref 0.2–1.3)
BUN SERPL-MCNC: 17 MG/DL (ref 7–30)
CALCIUM SERPL-MCNC: 8.2 MG/DL (ref 8.5–10.1)
CHLORIDE SERPL-SCNC: 102 MMOL/L (ref 94–109)
CK SERPL-CCNC: 120 U/L (ref 30–300)
CO2 SERPL-SCNC: 29 MMOL/L (ref 20–32)
CREAT SERPL-MCNC: 1.11 MG/DL (ref 0.66–1.25)
CRP SERPL-MCNC: 58 MG/L (ref 0–8)
D DIMER PPP FEU-MCNC: 4.2 UG/ML FEU (ref 0–0.5)
D DIMER PPP FEU-MCNC: 4.2 UG/ML FEU (ref 0–0.5)
DIFFERENTIAL METHOD BLD: ABNORMAL
EOSINOPHIL # BLD AUTO: 0 10E9/L (ref 0–0.7)
EOSINOPHIL NFR BLD AUTO: 0 %
ERYTHROCYTE [DISTWIDTH] IN BLOOD BY AUTOMATED COUNT: 12.6 % (ref 10–15)
FERRITIN SERPL-MCNC: 987 NG/ML (ref 26–388)
FIBRINOGEN PPP-MCNC: 655 MG/DL (ref 200–420)
GFR SERPL CREATININE-BSD FRML MDRD: 78 ML/MIN/{1.73_M2}
GLUCOSE SERPL-MCNC: 145 MG/DL (ref 70–99)
HCO3 BLDV-SCNC: 28 MMOL/L (ref 21–28)
HCT VFR BLD AUTO: 47.8 % (ref 40–53)
HGB BLD-MCNC: 16.9 G/DL (ref 13.3–17.7)
IMM PLASMA CELLS NFR BLD: 1 %
INR PPP: 1.09 (ref 0.86–1.14)
LACTATE BLD-SCNC: 1.3 MMOL/L (ref 0.7–2)
LDH SERPL L TO P-CCNC: 325 U/L (ref 85–227)
LYMPHOCYTES # BLD AUTO: 1.3 10E9/L (ref 0.8–5.3)
LYMPHOCYTES NFR BLD AUTO: 18 %
MAGNESIUM SERPL-MCNC: 2.2 MG/DL (ref 1.6–2.3)
MCH RBC QN AUTO: 32 PG (ref 26.5–33)
MCHC RBC AUTO-ENTMCNC: 35.4 G/DL (ref 31.5–36.5)
MCV RBC AUTO: 91 FL (ref 78–100)
METAMYELOCYTES # BLD: 0.1 10E9/L
METAMYELOCYTES NFR BLD MANUAL: 1 %
MONOCYTES # BLD AUTO: 0.3 10E9/L (ref 0–1.3)
MONOCYTES NFR BLD AUTO: 4 %
NEUTROPHILS # BLD AUTO: 5.3 10E9/L (ref 1.6–8.3)
NEUTROPHILS NFR BLD AUTO: 75 %
O2/TOTAL GAS SETTING VFR VENT: NORMAL %
OXYHGB MFR BLDV: 76 %
PCO2 BLDV: 42 MM HG (ref 40–50)
PH BLDV: 7.43 PH (ref 7.32–7.43)
PLASMA CELLS # BLD MANUAL: 0.1 10E9/L
PLATELET # BLD AUTO: 402 10E9/L (ref 150–450)
PLATELET # BLD EST: ABNORMAL 10*3/UL
PO2 BLDV: 39 MM HG (ref 25–47)
POTASSIUM SERPL-SCNC: 3.1 MMOL/L (ref 3.4–5.3)
POTASSIUM SERPL-SCNC: 3.4 MMOL/L (ref 3.4–5.3)
PROCALCITONIN SERPL-MCNC: 0.21 NG/ML
PROT SERPL-MCNC: 6.4 G/DL (ref 6.8–8.8)
RBC # BLD AUTO: 5.28 10E12/L (ref 4.4–5.9)
RBC MORPH BLD: ABNORMAL
RETICS # AUTO: 100.3 10E9/L (ref 25–95)
RETICS/RBC NFR AUTO: 2.2 % (ref 0.5–2)
SODIUM SERPL-SCNC: 137 MMOL/L (ref 133–144)
SPECIMEN EXP DATE BLD: NORMAL
TROPONIN I SERPL-MCNC: <0.015 UG/L (ref 0–0.04)
WBC # BLD AUTO: 7.1 10E9/L (ref 4–11)

## 2020-11-15 PROCEDURE — 84132 ASSAY OF SERUM POTASSIUM: CPT | Performed by: PHYSICIAN ASSISTANT

## 2020-11-15 PROCEDURE — 36415 COLL VENOUS BLD VENIPUNCTURE: CPT | Performed by: EMERGENCY MEDICINE

## 2020-11-15 PROCEDURE — 84145 PROCALCITONIN (PCT): CPT | Performed by: EMERGENCY MEDICINE

## 2020-11-15 PROCEDURE — 120N000001 HC R&B MED SURG/OB

## 2020-11-15 PROCEDURE — 86901 BLOOD TYPING SEROLOGIC RH(D): CPT | Performed by: PHYSICIAN ASSISTANT

## 2020-11-15 PROCEDURE — 93005 ELECTROCARDIOGRAM TRACING: CPT

## 2020-11-15 PROCEDURE — 82728 ASSAY OF FERRITIN: CPT | Performed by: PHYSICIAN ASSISTANT

## 2020-11-15 PROCEDURE — 85379 FIBRIN DEGRADATION QUANT: CPT | Performed by: EMERGENCY MEDICINE

## 2020-11-15 PROCEDURE — 250N000011 HC RX IP 250 OP 636: Performed by: PHYSICIAN ASSISTANT

## 2020-11-15 PROCEDURE — 85730 THROMBOPLASTIN TIME PARTIAL: CPT | Performed by: PHYSICIAN ASSISTANT

## 2020-11-15 PROCEDURE — 85610 PROTHROMBIN TIME: CPT | Performed by: PHYSICIAN ASSISTANT

## 2020-11-15 PROCEDURE — 80048 BASIC METABOLIC PNL TOTAL CA: CPT | Performed by: EMERGENCY MEDICINE

## 2020-11-15 PROCEDURE — 82550 ASSAY OF CK (CPK): CPT | Performed by: PHYSICIAN ASSISTANT

## 2020-11-15 PROCEDURE — 86900 BLOOD TYPING SEROLOGIC ABO: CPT | Performed by: PHYSICIAN ASSISTANT

## 2020-11-15 PROCEDURE — 85379 FIBRIN DEGRADATION QUANT: CPT | Performed by: PHYSICIAN ASSISTANT

## 2020-11-15 PROCEDURE — 99285 EMERGENCY DEPT VISIT HI MDM: CPT | Mod: 25

## 2020-11-15 PROCEDURE — 250N000009 HC RX 250: Performed by: PHYSICIAN ASSISTANT

## 2020-11-15 PROCEDURE — 85025 COMPLETE CBC W/AUTO DIFF WBC: CPT | Performed by: EMERGENCY MEDICINE

## 2020-11-15 PROCEDURE — 83615 LACTATE (LD) (LDH) ENZYME: CPT | Performed by: PHYSICIAN ASSISTANT

## 2020-11-15 PROCEDURE — 85045 AUTOMATED RETICULOCYTE COUNT: CPT | Performed by: PHYSICIAN ASSISTANT

## 2020-11-15 PROCEDURE — 80076 HEPATIC FUNCTION PANEL: CPT | Performed by: PHYSICIAN ASSISTANT

## 2020-11-15 PROCEDURE — 36415 COLL VENOUS BLD VENIPUNCTURE: CPT | Performed by: PHYSICIAN ASSISTANT

## 2020-11-15 PROCEDURE — 71045 X-RAY EXAM CHEST 1 VIEW: CPT

## 2020-11-15 PROCEDURE — 258N000003 HC RX IP 258 OP 636: Performed by: EMERGENCY MEDICINE

## 2020-11-15 PROCEDURE — 83520 IMMUNOASSAY QUANT NOS NONAB: CPT | Performed by: PHYSICIAN ASSISTANT

## 2020-11-15 PROCEDURE — 82805 BLOOD GASES W/O2 SATURATION: CPT | Performed by: EMERGENCY MEDICINE

## 2020-11-15 PROCEDURE — 83735 ASSAY OF MAGNESIUM: CPT | Performed by: EMERGENCY MEDICINE

## 2020-11-15 PROCEDURE — 99223 1ST HOSP IP/OBS HIGH 75: CPT | Mod: AI | Performed by: PHYSICIAN ASSISTANT

## 2020-11-15 PROCEDURE — 83605 ASSAY OF LACTIC ACID: CPT | Performed by: EMERGENCY MEDICINE

## 2020-11-15 PROCEDURE — 96360 HYDRATION IV INFUSION INIT: CPT

## 2020-11-15 PROCEDURE — 85384 FIBRINOGEN ACTIVITY: CPT | Performed by: PHYSICIAN ASSISTANT

## 2020-11-15 PROCEDURE — 86140 C-REACTIVE PROTEIN: CPT | Performed by: EMERGENCY MEDICINE

## 2020-11-15 PROCEDURE — 87040 BLOOD CULTURE FOR BACTERIA: CPT | Performed by: EMERGENCY MEDICINE

## 2020-11-15 PROCEDURE — 84484 ASSAY OF TROPONIN QUANT: CPT | Performed by: PHYSICIAN ASSISTANT

## 2020-11-15 PROCEDURE — 258N000003 HC RX IP 258 OP 636: Performed by: PHYSICIAN ASSISTANT

## 2020-11-15 PROCEDURE — XW033E5 INTRODUCTION OF REMDESIVIR ANTI-INFECTIVE INTO PERIPHERAL VEIN, PERCUTANEOUS APPROACH, NEW TECHNOLOGY GROUP 5: ICD-10-PCS | Performed by: INTERNAL MEDICINE

## 2020-11-15 PROCEDURE — 85300 ANTITHROMBIN III ACTIVITY: CPT | Performed by: PHYSICIAN ASSISTANT

## 2020-11-15 RX ORDER — LIDOCAINE 40 MG/G
CREAM TOPICAL
Status: DISCONTINUED | OUTPATIENT
Start: 2020-11-15 | End: 2020-11-22 | Stop reason: HOSPADM

## 2020-11-15 RX ORDER — NALOXONE HYDROCHLORIDE 0.4 MG/ML
.1-.4 INJECTION, SOLUTION INTRAMUSCULAR; INTRAVENOUS; SUBCUTANEOUS
Status: DISCONTINUED | OUTPATIENT
Start: 2020-11-15 | End: 2020-11-22 | Stop reason: HOSPADM

## 2020-11-15 RX ORDER — OXYCODONE HYDROCHLORIDE 5 MG/1
5-10 TABLET ORAL
Status: DISCONTINUED | OUTPATIENT
Start: 2020-11-15 | End: 2020-11-22 | Stop reason: HOSPADM

## 2020-11-15 RX ORDER — LIDOCAINE 40 MG/G
CREAM TOPICAL
Status: DISCONTINUED | OUTPATIENT
Start: 2020-11-15 | End: 2020-11-15

## 2020-11-15 RX ORDER — ONDANSETRON 4 MG/1
4 TABLET, ORALLY DISINTEGRATING ORAL EVERY 6 HOURS PRN
Status: DISCONTINUED | OUTPATIENT
Start: 2020-11-15 | End: 2020-11-22 | Stop reason: HOSPADM

## 2020-11-15 RX ORDER — POLYETHYLENE GLYCOL 3350 17 G/17G
17 POWDER, FOR SOLUTION ORAL DAILY PRN
Status: DISCONTINUED | OUTPATIENT
Start: 2020-11-15 | End: 2020-11-22 | Stop reason: HOSPADM

## 2020-11-15 RX ORDER — ONDANSETRON 2 MG/ML
4 INJECTION INTRAMUSCULAR; INTRAVENOUS EVERY 6 HOURS PRN
Status: DISCONTINUED | OUTPATIENT
Start: 2020-11-15 | End: 2020-11-22 | Stop reason: HOSPADM

## 2020-11-15 RX ORDER — ALBUTEROL SULFATE 90 UG/1
2 AEROSOL, METERED RESPIRATORY (INHALATION) EVERY 4 HOURS PRN
Status: DISCONTINUED | OUTPATIENT
Start: 2020-11-15 | End: 2020-11-22 | Stop reason: HOSPADM

## 2020-11-15 RX ORDER — SODIUM CHLORIDE, SODIUM LACTATE, POTASSIUM CHLORIDE, CALCIUM CHLORIDE 600; 310; 30; 20 MG/100ML; MG/100ML; MG/100ML; MG/100ML
INJECTION, SOLUTION INTRAVENOUS CONTINUOUS
Status: DISCONTINUED | OUTPATIENT
Start: 2020-11-15 | End: 2020-11-15

## 2020-11-15 RX ORDER — ACETAMINOPHEN 325 MG/1
650 TABLET ORAL EVERY 4 HOURS PRN
Status: DISCONTINUED | OUTPATIENT
Start: 2020-11-15 | End: 2020-11-22 | Stop reason: HOSPADM

## 2020-11-15 RX ORDER — CEFTRIAXONE 2 G/1
2 INJECTION, POWDER, FOR SOLUTION INTRAMUSCULAR; INTRAVENOUS EVERY 24 HOURS
Status: DISCONTINUED | OUTPATIENT
Start: 2020-11-15 | End: 2020-11-16

## 2020-11-15 RX ORDER — ACETAMINOPHEN 650 MG/1
650 SUPPOSITORY RECTAL EVERY 4 HOURS PRN
Status: DISCONTINUED | OUTPATIENT
Start: 2020-11-15 | End: 2020-11-22 | Stop reason: HOSPADM

## 2020-11-15 RX ORDER — DEXAMETHASONE SODIUM PHOSPHATE 4 MG/ML
6 INJECTION, SOLUTION INTRA-ARTICULAR; INTRALESIONAL; INTRAMUSCULAR; INTRAVENOUS; SOFT TISSUE DAILY
Status: DISCONTINUED | OUTPATIENT
Start: 2020-11-15 | End: 2020-11-22 | Stop reason: HOSPADM

## 2020-11-15 RX ADMIN — SODIUM CHLORIDE, POTASSIUM CHLORIDE, SODIUM LACTATE AND CALCIUM CHLORIDE 500 ML: 600; 310; 30; 20 INJECTION, SOLUTION INTRAVENOUS at 17:38

## 2020-11-15 RX ADMIN — DEXAMETHASONE SODIUM PHOSPHATE 6 MG: 4 INJECTION, SOLUTION INTRAMUSCULAR; INTRAVENOUS at 21:52

## 2020-11-15 RX ADMIN — REMDESIVIR 200 MG: 100 INJECTION, POWDER, LYOPHILIZED, FOR SOLUTION INTRAVENOUS at 21:52

## 2020-11-15 RX ADMIN — ENOXAPARIN SODIUM 40 MG: 40 INJECTION SUBCUTANEOUS at 21:52

## 2020-11-15 RX ADMIN — CEFTRIAXONE 2 G: 2 INJECTION, POWDER, FOR SOLUTION INTRAMUSCULAR; INTRAVENOUS at 23:46

## 2020-11-15 ASSESSMENT — ENCOUNTER SYMPTOMS
SHORTNESS OF BREATH: 1
COUGH: 1

## 2020-11-15 NOTE — LETTER
[place] [address] [facility]  [address]  [facility] [address]  [phone]    Re: Dakotah Zheng  [address]  [facility] [address]  [phone] (home) none (work)    : 1970      To Whom It May Concern:      Dakotah Zheng was hospitalized from 11/15/2020 until 2020 due to medical illness.  He may return to work when cleared at outpatient follow up with full duty.        Sincerely,        Rodney Coreas MD, MD

## 2020-11-15 NOTE — ED NOTES
.  [facility]  ED Nurse Handoff Report    Dakotah Zheng is a 50 year old male   ED Chief complaint: Shortness of Breath  . ED Diagnosis:   Final diagnoses:   Pneumonia due to COVID-19 virus     Allergies: No Known Allergies    Code Status: Full Code  Activity level - Baseline/Home:  Independent. Activity Level - Current:   Stand by Assist. Lift room needed: No. Bariatric: No   Needed: No   Isolation: Yes. Infection: Not Applicable  COVID r/o and special precautions.     Vital Signs:   Vitals:    11/15/20 1745 11/15/20 1800 11/15/20 1815 11/15/20 1830   BP:  122/79  116/76   Pulse:  74  77   Resp:       Temp:       TempSrc:       SpO2: 97% 97% 94% 95%       Cardiac Rhythm:  ,      Pain level: 0-10 Pain Scale: 5  Patient confused: No. Patient Falls Risk: Yes.   Elimination Status: has not voided as of 1748   Patient Report - Initial Complaint: 50 year old male who presents with shortness of breath. Per chart review, patient had a positive Covid-19 test on 11/7. He reports that for the last 2 weeks he has had a fever, dry cough, fatigue, and myalgias. He states that in the last week his shortness of breath has been progressively worsening which is what prompted his visit to the ED today. On examination, he is on supplemental oxygen which he reports is helping with his shortness of breath. He denies any nasal congestion, hemoptysis, chest pain, or history of smoking or heart or lung disease. Additionally he reports that he feels dizzy while he is standing and urinating. He has been able to tolerate PO fluids. He has been isolating from his there family members in his house. .   Focused Assessment:   Musculoskeletal Musculoskeletal - Musculoskeletal WDL: .WDL except; all  Pain Body Location:  (generalized body aches/fatigue over past 3 days) CMS Intact: Yes  [place]     16:16 Respiratory Respiratory - Respiratory WDL: .WDL except; all  Rhythm/Pattern, Respiratory: shortness of breath; tachypnea;  hypopnea (satting 87-89% on RA; placed on 2L O2 via nc. Short of breath past 3 days) Cough Frequency: infrequent         Tests Performed: EKG, labs, CXR, cultures. Abnormal Results: .  Labs Ordered and Resulted from Time of ED Arrival Up to the Time of Departure from the ED   BASIC METABOLIC PANEL - Abnormal; Notable for the following components:       Result Value    Potassium 3.1 (*)     Glucose 145 (*)     Calcium 8.2 (*)     All other components within normal limits   CRP INFLAMMATION - Abnormal; Notable for the following components:    CRP Inflammation 58.0 (*)     All other components within normal limits   CBC WITH PLATELETS DIFFERENTIAL   D DIMER QUANTITATIVE   MAGNESIUM   TROPONIN I   BLOOD GAS VENOUS AND OXYHGB   LACTIC ACID WHOLE BLOOD   PULSE OXIMETRY NURSING   PERIPHERAL IV CATHETER   BLOOD CULTURE   BLOOD CULTURE     .  XR Chest Port 1 View   Final Result   IMPRESSION: Extensive bilateral pulmonary alveolar infiltrates predominantly in the periphery. Findings compatible bilateral pneumonia. Normal heart size and pulmonary vascularity. Surgical clips right upper quadrant. Osseous structures unremarkable.         Treatments provided: see ED meds  Family Comments: NA  OBS brochure/video discussed/provided to patient:  N/A  ED Medications:   Medications   lidocaine 1 % 0.1-1 mL (has no administration in time range)   lidocaine (LMX4) cream (has no administration in time range)   sodium chloride (PF) 0.9% PF flush 3 mL (has no administration in time range)   sodium chloride (PF) 0.9% PF flush 3 mL (has no administration in time range)   lactated ringers BOLUS 500 mL (500 mLs Intravenous New Bag 11/15/20 2273)     Followed by   lactated ringers infusion (has no administration in time range)     Drips infusing:  No  For the majority of the shift, the patient's behavior Green. Interventions performed were NA.    Sepsis treatment initiated: No     Patient tested for COVID 19 prior to admission: YES    ED Nurse  Name/Phone Number: Allie Willis RN,   5:48 PM    RECEIVING UNIT ED HANDOFF REVIEW    Above ED Nurse Handoff Report was reviewed: Yes  Reviewed by: Zuleyma Samuel RN on November 15, 2020 at 7:44 PM

## 2020-11-15 NOTE — ED TRIAGE NOTES
Pt positive for COVID.  Reports he's been ill for two weeks.  Pt here because of fatigue, body aches, cough.  Requesting something to make him feel better.

## 2020-11-15 NOTE — ED PROVIDER NOTES
History     Chief Complaint:  Shortness of Breath      HPI   Dakotah Zheng is a 50 year old male who presents with shortness of breath. Per chart review, patient had a positive Covid-19 test on 11/7. He reports that for the last 2 weeks he has had a fever, dry cough, fatigue, and myalgias. He states that in the last week his shortness of breath has been progressively worsening which is what prompted his visit to the ED today. On examination, he is on supplemental oxygen which he reports is helping with his shortness of breath. He denies any nasal congestion, hemoptysis, chest pain, or history of smoking or heart or lung disease. Additionally he reports that he feels dizzy while he is standing and urinating. He has been able to tolerate PO fluids. He has been isolating from his there family members in his house.     Allergies:  The patient has no known drug allergies.    Medications:    The patient is currently on no regular medications.     Past Medical History:    The patient is currently on no regular medications.    Past Surgical History:    Tonsillectomy     Family History:    Diabetes    Social History:  Tobacco use: Never  Alcohol use: No  Drug use: No  PCP: Ilda Art  Marital Status:   [2]    Review of Systems   HENT: Negative for congestion.    Respiratory: Positive for cough (dry) and shortness of breath.    Cardiovascular: Negative for chest pain.   All other systems reviewed and are negative.        Physical Exam     Patient Vitals for the past 24 hrs:   BP Temp Temp src Pulse Resp SpO2   11/15/20 2008 138/85 98.2  F (36.8  C) Oral 84 22 94 %   11/15/20 1830 116/76 -- -- 77 -- 95 %   11/15/20 1815 -- -- -- -- -- 94 %   11/15/20 1800 122/79 -- -- 74 -- 97 %   11/15/20 1745 -- -- -- -- -- 97 %   11/15/20 1730 128/83 -- -- 96 -- 93 %   11/15/20 1700 118/82 -- -- 98 -- 94 %   11/15/20 1630 115/78 -- -- 95 -- 93 %   11/15/20 1618 -- -- -- -- (!) 36 --   11/15/20 1615 (!) 127/90 -- -- 99 -- 92 %    11/15/20 1600 131/89 -- -- -- -- --   11/15/20 1417 126/76 98.6  F (37  C) Oral 122 16 92 %       Physical Exam  GEN: alert. Lying on gurney comfortably     HEAD: atraumatic    EYES: pupils reactive, extraocular muscles intact, conjunctivae normal    ENT: Moist oral mucosa, oral pharynx clear; nose clear    NECK: Normal ROM, trachea midline    RESPIRATORY: no tachypnea, normal work of breathing, Fine crackles through out all lung fields, raspy dry cough.     CVS: normal S1/S2, no murmurs/rubs/gallops    ABDOMEN: soft, nontender, no masses or organomegaly, no rebound, positive bowel sounds    MUSCULOSKELETAL: no deformities    SKIN: warm and dry, no acute rashes or ulceration, no erythema     NEURO: GCS 15, cranial nerves intact.  Motor and sensory- no focal deficits.     LYMPH: no lymphadenopathy      Emergency Department Course   Imaging:  Radiographic findings were communicated with the patient who voiced understanding of the findings.    XR Chest 1 view PORT:   Extensive bilateral pulmonary alveolar infiltrates predominantly in the periphery. Findings compatible bilateral pneumonia. Normal heart size and pulmonary vascularity. Surgical clips right upper quadrant. Osseous structures unremarkable, as per radiology.      Laboratory:  CBC: WBC: 7.1, HGB: 16.9, PLT: 402  BMP: Glucose 145 (H), Potassium: 3.1 (L), Calcium: 8.2 (L), o/w WNL (Creatinine: 1.11)    VBG and oxyhgb: pH 7.43 / PCO2 42 / PO2 39 / Bicarb 28 / FlO2 2L / Oxyhemoglobin 76 / Base excess 3.3    Troponin <0.015  Lactic acid (1858): 1.3    CRP inflammation: 58.0 (H)    D-dimer: 4.2 (H)   Magnesium: 2.2   Procalcitonin: pending     Blood Cultures x2: Pending    Interventions:  1815 Lactated Ringers 500 ml IV     Emergency Department Course:  Nursing notes and vitals reviewed. (1722) I performed an exam of the patient as documented above.     IV inserted. Medicine administered as documented above. Blood drawn. This was sent to the lab for further  testing, results above.    The patient was sent for a chest XR while in the emergency department, findings above.     (1745) I rechecked the patient and discussed the results of his workup thus far.     (1755)  I consulted with Reshma Hernadez for Dr. Ma of the hospitalist services. They are in agreement to accept the patient for admission.    Findings and plan explained to the Patient who consents to admission. Discussed the patient with Dr. Ma, who will admit the patient to a [facility] bed for further monitoring, evaluation, and treatment.    Impression & Plan      Covid-19  Dakotah Zheng was evaluated during a global COVID-19 pandemic, which necessitated consideration that the patient might be at risk for infection with the SARS-CoV-2 virus that causes COVID-19.   Applicable protocols for evaluation were followed during the patient's care.   COVID-19 was considered as part of the patient's evaluation. The plan for testing is:  a test was obtained at a previous visit and reviewed & considered today.      Medical Decision Making:  Patient is a 50-year-old  male who presents with shortness of breath, positive Covid test on 7 November and now Covid pneumonia.  He has hypoxic respiratory failure but responded well to nasal cannula oxygen at 2 to 3 L/min.  There are no signs of cardiomyopathy with a normal troponin and nondiagnostic EKG without signs of ischemia.  Patient's chest x-ray appears classic for Covid pneumonitis and he is being admitted to the medical floor in care of the hospitalist for continued oxygenation and treatment of his Covid pneumonia.    Diagnosis:    ICD-10-CM    1. Pneumonia due to COVID-19 virus  U07.1 Blood culture    J12.89 Blood gas venous and oxyhgb     Blood culture     CBC with platelets differential     D dimer quantitative     D dimer quantitative     Lactic acid whole blood     Lactic acid whole blood     Procalcitonin     Procalcitonin     CANCELED: Lactic acid whole blood      CANCELED: D dimer quantitative     CANCELED: Procalcitonin       Disposition:  Admitted to Dr. Ma    Scribe Disclosure:  I, Cecile Perez, am serving as a scribe on 11/15/2020 at 5:22 PM to personally document services performed by Jacob Dowell MD based on my observations and the provider's statements to me.     Cecile Perez  11/15/2020   [facility] EMERGENCY DEPT       Jacob Dowell MD  11/16/20 0008

## 2020-11-16 LAB
ANION GAP SERPL CALCULATED.3IONS-SCNC: 7 MMOL/L (ref 3–14)
AT III ACT/NOR PPP CHRO: 101 % (ref 85–135)
BASOPHILS # BLD AUTO: 0 10E9/L (ref 0–0.2)
BASOPHILS NFR BLD AUTO: 0.2 %
BUN SERPL-MCNC: 15 MG/DL (ref 7–30)
CALCIUM SERPL-MCNC: 8 MG/DL (ref 8.5–10.1)
CHLORIDE SERPL-SCNC: 107 MMOL/L (ref 94–109)
CO2 SERPL-SCNC: 24 MMOL/L (ref 20–32)
CREAT SERPL-MCNC: 0.77 MG/DL (ref 0.66–1.25)
CRP SERPL-MCNC: 36 MG/L (ref 0–8)
D DIMER PPP FEU-MCNC: 3 UG/ML FEU (ref 0–0.5)
DIFFERENTIAL METHOD BLD: NORMAL
EOSINOPHIL # BLD AUTO: 0 10E9/L (ref 0–0.7)
EOSINOPHIL NFR BLD AUTO: 0 %
ERYTHROCYTE [DISTWIDTH] IN BLOOD BY AUTOMATED COUNT: 12.8 % (ref 10–15)
FIBRINOGEN PPP-MCNC: 701 MG/DL (ref 200–420)
GFR SERPL CREATININE-BSD FRML MDRD: >90 ML/MIN/{1.73_M2}
GLUCOSE SERPL-MCNC: 128 MG/DL (ref 70–99)
HCT VFR BLD AUTO: 44.7 % (ref 40–53)
HGB BLD-MCNC: 15.8 G/DL (ref 13.3–17.7)
IL6 SERPL-MCNC: 22.93 PG/ML
IMM GRANULOCYTES # BLD: 0.1 10E9/L (ref 0–0.4)
IMM GRANULOCYTES NFR BLD: 1.3 %
INR PPP: 1.13 (ref 0.86–1.14)
LDH SERPL L TO P-CCNC: 316 U/L (ref 85–227)
LYMPHOCYTES # BLD AUTO: 1.1 10E9/L (ref 0.8–5.3)
LYMPHOCYTES NFR BLD AUTO: 24.1 %
MCH RBC QN AUTO: 32.1 PG (ref 26.5–33)
MCHC RBC AUTO-ENTMCNC: 35.3 G/DL (ref 31.5–36.5)
MCV RBC AUTO: 91 FL (ref 78–100)
MONOCYTES # BLD AUTO: 0.2 10E9/L (ref 0–1.3)
MONOCYTES NFR BLD AUTO: 3.3 %
NEUTROPHILS # BLD AUTO: 3.2 10E9/L (ref 1.6–8.3)
NEUTROPHILS NFR BLD AUTO: 71.1 %
NRBC # BLD AUTO: 0 10*3/UL
NRBC BLD AUTO-RTO: 0 /100
PLATELET # BLD AUTO: 371 10E9/L (ref 150–450)
PLATELET # BLD EST: NORMAL 10*3/UL
POTASSIUM SERPL-SCNC: 4 MMOL/L (ref 3.4–5.3)
RBC # BLD AUTO: 4.92 10E12/L (ref 4.4–5.9)
RBC MORPH BLD: NORMAL
RETICS # AUTO: 119.1 10E9/L (ref 25–95)
RETICS/RBC NFR AUTO: 2.4 % (ref 0.5–2)
SODIUM SERPL-SCNC: 138 MMOL/L (ref 133–144)
TROPONIN I SERPL-MCNC: <0.015 UG/L (ref 0–0.04)
WBC # BLD AUTO: 4.5 10E9/L (ref 4–11)

## 2020-11-16 PROCEDURE — 250N000009 HC RX 250: Performed by: PHYSICIAN ASSISTANT

## 2020-11-16 PROCEDURE — 84484 ASSAY OF TROPONIN QUANT: CPT | Performed by: PHYSICIAN ASSISTANT

## 2020-11-16 PROCEDURE — 85384 FIBRINOGEN ACTIVITY: CPT | Performed by: PHYSICIAN ASSISTANT

## 2020-11-16 PROCEDURE — 258N000003 HC RX IP 258 OP 636: Performed by: PHYSICIAN ASSISTANT

## 2020-11-16 PROCEDURE — 120N000001 HC R&B MED SURG/OB

## 2020-11-16 PROCEDURE — 85025 COMPLETE CBC W/AUTO DIFF WBC: CPT | Performed by: PHYSICIAN ASSISTANT

## 2020-11-16 PROCEDURE — 85379 FIBRIN DEGRADATION QUANT: CPT | Performed by: PHYSICIAN ASSISTANT

## 2020-11-16 PROCEDURE — 85610 PROTHROMBIN TIME: CPT | Performed by: PHYSICIAN ASSISTANT

## 2020-11-16 PROCEDURE — 250N000011 HC RX IP 250 OP 636: Performed by: PHYSICIAN ASSISTANT

## 2020-11-16 PROCEDURE — 86140 C-REACTIVE PROTEIN: CPT | Performed by: PHYSICIAN ASSISTANT

## 2020-11-16 PROCEDURE — 83615 LACTATE (LD) (LDH) ENZYME: CPT | Performed by: PHYSICIAN ASSISTANT

## 2020-11-16 PROCEDURE — 250N000013 HC RX MED GY IP 250 OP 250 PS 637: Performed by: PHYSICIAN ASSISTANT

## 2020-11-16 PROCEDURE — 80048 BASIC METABOLIC PNL TOTAL CA: CPT | Performed by: PHYSICIAN ASSISTANT

## 2020-11-16 PROCEDURE — 85045 AUTOMATED RETICULOCYTE COUNT: CPT | Performed by: PHYSICIAN ASSISTANT

## 2020-11-16 PROCEDURE — 36415 COLL VENOUS BLD VENIPUNCTURE: CPT | Performed by: PHYSICIAN ASSISTANT

## 2020-11-16 PROCEDURE — 99233 SBSQ HOSP IP/OBS HIGH 50: CPT | Performed by: HOSPITALIST

## 2020-11-16 RX ADMIN — ENOXAPARIN SODIUM 40 MG: 40 INJECTION SUBCUTANEOUS at 16:53

## 2020-11-16 RX ADMIN — DEXAMETHASONE SODIUM PHOSPHATE 6 MG: 4 INJECTION, SOLUTION INTRAMUSCULAR; INTRAVENOUS at 09:51

## 2020-11-16 RX ADMIN — REMDESIVIR 100 MG: 100 INJECTION, POWDER, LYOPHILIZED, FOR SOLUTION INTRAVENOUS at 16:59

## 2020-11-16 RX ADMIN — ACETAMINOPHEN 650 MG: 325 TABLET, FILM COATED ORAL at 00:28

## 2020-11-16 RX ADMIN — AZITHROMYCIN MONOHYDRATE 500 MG: 500 INJECTION, POWDER, LYOPHILIZED, FOR SOLUTION INTRAVENOUS at 00:29

## 2020-11-16 ASSESSMENT — ACTIVITIES OF DAILY LIVING (ADL)
ADLS_ACUITY_SCORE: 17

## 2020-11-16 ASSESSMENT — MIFFLIN-ST. JEOR: SCORE: 1517.64

## 2020-11-16 NOTE — H&P
History and Physical     Dakotah Zheng MRN# [id number]   YOB: 1970 Age: 50 year old      Date of Admission:  11/15/2020    Primary care provider: Ilda Art          Assessment and Plan:   Dakotah Zheng is a 50 year old male with a PMH significant for recent COVID diagnosis on 11/5, who presents with increased non productive cough, shortness of breath, fever and diarrhea.     Patient was discussed with Dr. Ahn, who was provider in ED. Chart review of ED work up was reviewed as well as chart review of Care Everywhere, previous visits and admissions.     #COVID-19 pneumonia  Diagnosed with COVID-19 on 11/5 with steady worsening of shortness of breath, cough, fever and diarrhea.  In the emergency room temp 98.6, pulse 122, pressure 126/76 and requiring 2 L of oxygen to keep sats in the low 90s.  Portable chest x-ray shows extensive bilateral pulmonary alveolar infiltrates.   -Continue oxygen support  -Agreeable to initiation of dexamethasone and remdesivir  -Added on coverage for potential bacterial pneumonia with Ceftriaxone and Azithromcyin  -Add Procalcitonin  -We will obtain COVID-19 labs  -COVID-19 precautions  -Tylenol as needed        Social: No concerns  Code: Discussed with patient and they have chosen full code  VTE prophylaxis: Lovenox  Disposition: Inpatient                    Chief Complaint:   Cough, shortness of breath and fever         History of Present Illness:   Dakotah Zheng is a 50 year old male who presents with worsening nonproductive cough, shortness of breath, fever and diarrhea.  He reports he has not felt well for approximately 2 weeks with steady worsening.  He denies chest discomfort, nausea, vomiting and urinary symptoms.  He has had some mild abdominal discomfort with diarrhea.  He is uncertain where he became infected with Covid but thinks it may have been at work.  He does not have any medical illnesses and denies daily alcohol use and smoking.             Past Medical  History:   Denies medical problems            Past Surgical History:     Past Surgical History:   Procedure Laterality Date     TONSILLECTOMY ADULT  06/16/2015               Social History:     Social History     Socioeconomic History     Marital status:      Spouse name: Not on file     Number of children: Not on file     Years of education: Not on file     Highest education level: Not on file   Occupational History     Not on file   Social Needs     Financial resource strain: Not on file     Food insecurity     Worry: Not on file     Inability: Not on file     Transportation needs     Medical: Not on file     Non-medical: Not on file   Tobacco Use     Smoking status: Never Smoker   Substance and Sexual Activity     Alcohol use: No     Alcohol/week: 0.0 standard drinks     Drug use: No     Sexual activity: Not on file   Lifestyle     Physical activity     Days per week: Not on file     Minutes per session: Not on file     Stress: Not on file   Relationships     Social connections     Talks on phone: Not on file     Gets together: Not on file     Attends L[employer]-day service: Not on file     Active member of club or organization: Not on file     Attends meetings of clubs or organizations: Not on file     Relationship status: Not on file     Intimate partner violence     Fear of current or ex partner: Not on file     Emotionally abused: Not on file     Physically abused: Not on file     Forced sexual activity: Not on file   Other Topics Concern     Parent/sibling w/ CABG, MI or angioplasty before 65F 55M? Not Asked   Social History Narrative     Not on file               Family History:     Family History   Problem Relation Age of Onset     Diabetes Mother      Diabetes Maternal Grandmother               Allergies:    No Known Allergies            Medications:     Prior to Admission medications    Medication Sig Last Dose Taking? Auth Provider   HYDROcodone-acetaminophen (NORCO) 5-325 MG per tablet Take 1-2  tablets by mouth every 6 hours as needed for pain   Steve Garcia MD   Ibuprofen (ADVIL PO)    Reported, Patient              Review of Systems:   A Comprehensive greater than 10 system review of systems was carried out.  Pertinent positives and negatives are noted above.  Otherwise negative for contributory information.            Physical Exam:   Blood pressure 116/76, pulse 77, temperature 98.6  F (37  C), temperature source Oral, resp. rate (!) 36, SpO2 95 %.    Due to COVID pandemic physical exam on admission was deferred. Please see Dr. Zuniga physical exam tonight.  Ipad interview was performed and patient looked to be breathing without difficulty with intermittent coughing.               Data:     Recent Labs   Lab 11/15/20  1611   WBC 7.1   HGB 16.9   HCT 47.8   MCV 91        Recent Labs   Lab 11/15/20  1611      POTASSIUM 3.1*   CHLORIDE 102   CO2 29   ANIONGAP 6   *   BUN 17   CR 1.11   GFRESTIMATED 78   GFRESTBLACK 90   KYARA 8.2*   MAG 2.2     No results for input(s): DD in the last 168 hours.  No results for input(s): TROPONIN, TROPI, TROPR in the last 168 hours.    Invalid input(s): TROP, TROPONINIES      Recent Results (from the past 24 hour(s))   XR Chest Port 1 View    Narrative    EXAM: XR CHEST PORT 1 VW  LOCATION: [facility]  DATE/TIME: 11/15/2020 5:08 PM    INDICATION: Shortness of breath  COMPARISON: 06/12/2017      Impression    IMPRESSION: Extensive bilateral pulmonary alveolar infiltrates predominantly in the periphery. Findings compatible bilateral pneumonia. Normal heart size and pulmonary vascularity. Surgical clips right upper quadrant. Osseous structures unremarkable.         Reshma Hernadez PA-C    This patient was seen and discussed with Dr. Ma who agrees with the current plans as outlined above.

## 2020-11-16 NOTE — PHARMACY-ADMISSION MEDICATION HISTORY
Admission medication history interview status for this patient is complete. See [facility] admission navigator for allergy information, prior to admission medications and immunization status.     Medication history interview done via telephone during Covid-19 pandemic, indicate source(s): Patient  Medication history resources (including written lists, pill bottles, clinic record):None      Changes made to PTA medication list:  Added: none  Deleted: norco  Changed: none    Actions taken by pharmacist (provider contacted, etc):None     Additional medication history information:None    Medication reconciliation/reorder completed by provider prior to medication history?  n   (Y/N)         Prior to Admission medications    Not on File

## 2020-11-16 NOTE — ACP (ADVANCE CARE PLANNING)
SPIRITUAL HEALTH SERVICES Progress Note  [facility] Med/Surg 3rd floor     consult for assistance with ACP/HCD information. Visited by phone as pt is COVID positive.     Pt, Dakotah, shares that he is bilingual, and most comfortable speaking english.    Dakotah shares that he is interested in learning about HCD/ACP. Provided overview of ACP and specifically HCD including: Long/short forms; its purpose and function; how to make it legal and when it is used; what a HCA is and their role; what to do with document once it is completed and made legal.     Will coordinate provision of written material in support of conversation. Answered all of pt's questions. SH remains available.       JER Morton.  Staff     Office [phone]  Pronouns: he/him/his

## 2020-11-16 NOTE — PLAN OF CARE
VSS. 4 L NC. No tele. Covid positive, isolation precautions maintained. Pt denies pain, CP. Pt complains of SOB, especially upon exertion. LS diminished with crackles in bases bilat. Frequent cough, productive. RPIV saline locked. Regular diet, independent in room. Continue to monitor and POC.

## 2020-11-16 NOTE — PROVIDER NOTIFICATION
MD notified- Pt had K of 3.1 in ED. No replacement ordered. Please add replacement if appropriate, thank you!    Addendum: K redraw ordered and standard protocol placed. Recheck 3.4.

## 2020-11-16 NOTE — PLAN OF CARE
Pt admitted this shift d/t increased SOB. Pt A&Ox4. Denies pain. LS clear in upper lobes w/ fine crackles in bases. Pt has a frequent dry cough w/ TAYLOR. Required 4L O2 via NC this shift. All other VSS. Tylenol given for low grade temp. IV abx and IV meds for COVID19 administered this shift. PIV SL. Up SBA to bathroom. Calls appropriately. Lovenox subcutaneous given. No skin or GI/ concerns. Discharge TBD. Will continue to monitor per POC.    0600 - pt forgot to call and ambulated to bathroom w/o O2. O2 at 79% on room air after ambulating to bathroom. Writer reapplied 4L NC and pt quickly recovered with O2 sats >90%.

## 2020-11-16 NOTE — PROGRESS NOTES
[facility]    Hospitalist Progress Note    Date of Service (when I saw the patient): 11/16/2020  Provider:  Saul Yuen MD   Text Page  7am - 6PM       Assessment & Plan   Dakotah Zheng is a 50 year old male who has no past medical history of chronic disease..  He was admitted on 11/15/2020 with fever, diarrhea, progressive shortness of breath and nonproductive cough.  He has been diagnosed with COVID-19 infection on November 5.  He was quarantining at home until presentation to the emergency department.    #1.  COVID-19 infection, progressive symptomatology with fever, shortness of breath, cough and diarrhea.  #2.  COVID-19 pneumonia with bilateral infiltrates as documented in the chest x-ray.  #3.  Acute hypoxemic respiratory failure secondary to 1 and 2.      Plan.  Inpatient.  Regular diet as tolerated.  COVID-19 isolation.  Remdesivir per protocol to complete 5 days after loading dose.  Dexamethasone 6 mg 1 tablet daily.  Tylenol for pain or fever.  Lack of evidence of bacterial pneumonia, discontinue ceftriaxone and Zithromax.  Lovenox 40 mg subcu daily.  Albuterol MDI 2 puff every 4 hours as needed.  Oxygen per nasal cannula to keep oxygen saturation 94% or higher.      DVT Prophylaxis: Enoxaparin (Lovenox) SQ  Code Status: Full Code    Disposition: Expected discharge once breathing room air.    Interval History   He is feeling better today with less shortness of breath.  Denies fever at the moment.  No nausea no diarrhea.    -Data reviewed today: I reviewed all new labs and imaging results over the last 24 hours.     Physical Exam   Temp: 97.6  F (36.4  C) Temp src: Oral BP: 135/87 Pulse: 104   Resp: 18 SpO2: 93 % O2 Device: Nasal cannula Oxygen Delivery: 4 LPM  Vitals:    11/16/20 0320   Weight: 72.6 kg (160 lb)     Vital Signs with Ranges  Temp:  [97.6  F (36.4  C)-99.4  F (37.4  C)] 97.6  F (36.4  C)  Pulse:  [] 104  Resp:  [18-36] 18  BP: (115-138)/(68-90) 135/87  SpO2:   [79 %-97 %] 93 %  I/O last 3 completed shifts:  In: 740 [P.O.:240; IV Piggyback:500]  Out: -     GEN:  Alert, oriented x 3, appears comfortable, NAD.  HEENT:  Normocephalic/atraumatic, no scleral icterus, no nasal discharge, mouth moist.  CV:  Regular rate and rhythm, no murmur or JVD.  S1 + S2 noted, no S3 or S4.  LUNGS:  Clear to auscultation bilaterally without rales/rhonchi/wheezing/retractions.  Symmetric chest rise on inhalation noted.  ABD:  Active bowel sounds, soft, non-tender/non-distended.  No rebound/guarding/rigidity.  EXT:  No edema or cyanosis.  No joint synovitis noted.  SKIN:  Dry to touch, no exanthems noted in the visualized areas.       Medications     - MEDICATION INSTRUCTIONS -         azithromycin  250 mg Intravenous Q24H     cefTRIAXone  2 g Intravenous Q24H     dexamethasone  6 mg Intravenous Daily     enoxaparin ANTICOAGULANT  40 mg Subcutaneous Q24H     remdesivir  100 mg Intravenous Q24H     sodium chloride (PF)  3 mL Intracatheter Q8H       Data   Recent Labs   Lab 11/16/20  0716 11/15/20  2113 11/15/20  1611   WBC 4.5  --  7.1   HGB 15.8  --  16.9   MCV 91  --  91     --  402   INR 1.13 1.09  --      --  137   POTASSIUM 4.0 3.4 3.1*   CHLORIDE 107  --  102   CO2 24  --  29   BUN 15  --  17   CR 0.77  --  1.11   ANIONGAP 7  --  6   KYARA 8.0*  --  8.2*   *  --  145*   ALBUMIN  --  2.0*  --    PROTTOTAL  --  6.4*  --    BILITOTAL  --  0.9  --    ALKPHOS  --  193*  --    ALT  --  79*  --    AST  --  96*  --    TROPI <0.015 <0.015  --        Recent Results (from the past 24 hour(s))   XR Chest Port 1 View    Narrative    EXAM: XR CHEST PORT 1 VW  LOCATION: [facility]  DATE/TIME: 11/15/2020 5:08 PM    INDICATION: Shortness of breath  COMPARISON: 06/12/2017      Impression    IMPRESSION: Extensive bilateral pulmonary alveolar infiltrates predominantly in the periphery. Findings compatible bilateral pneumonia. Normal heart size and pulmonary vascularity.  Surgical clips right upper quadrant. Osseous structures unremarkable.             Disclaimer: This note consists of symbols derived from keyboarding, dictation and/or voice recognition software. As a result, there may be errors in the script that have gone undetected. Please consider this when interpreting information found in this chart.

## 2020-11-17 LAB
ANION GAP SERPL CALCULATED.3IONS-SCNC: 5 MMOL/L (ref 3–14)
BASOPHILS # BLD AUTO: 0 10E9/L (ref 0–0.2)
BASOPHILS NFR BLD AUTO: 0.3 %
BUN SERPL-MCNC: 14 MG/DL (ref 7–30)
CALCIUM SERPL-MCNC: 7.9 MG/DL (ref 8.5–10.1)
CHLORIDE SERPL-SCNC: 112 MMOL/L (ref 94–109)
CO2 SERPL-SCNC: 24 MMOL/L (ref 20–32)
CREAT SERPL-MCNC: 0.76 MG/DL (ref 0.66–1.25)
CRP SERPL-MCNC: 18.8 MG/L (ref 0–8)
D DIMER PPP FEU-MCNC: 3.1 UG/ML FEU (ref 0–0.5)
DIFFERENTIAL METHOD BLD: NORMAL
EOSINOPHIL # BLD AUTO: 0 10E9/L (ref 0–0.7)
EOSINOPHIL NFR BLD AUTO: 0.1 %
ERYTHROCYTE [DISTWIDTH] IN BLOOD BY AUTOMATED COUNT: 13.1 % (ref 10–15)
FIBRINOGEN PPP-MCNC: 572 MG/DL (ref 200–420)
GFR SERPL CREATININE-BSD FRML MDRD: >90 ML/MIN/{1.73_M2}
GLUCOSE SERPL-MCNC: 98 MG/DL (ref 70–99)
HCT VFR BLD AUTO: 46.1 % (ref 40–53)
HGB BLD-MCNC: 16.6 G/DL (ref 13.3–17.7)
IMM GRANULOCYTES # BLD: 0.1 10E9/L (ref 0–0.4)
IMM GRANULOCYTES NFR BLD: 1.1 %
INR PPP: 1.11 (ref 0.86–1.14)
LDH SERPL L TO P-CCNC: 308 U/L (ref 85–227)
LYMPHOCYTES # BLD AUTO: 2.2 10E9/L (ref 0.8–5.3)
LYMPHOCYTES NFR BLD AUTO: 25 %
MCH RBC QN AUTO: 32.5 PG (ref 26.5–33)
MCHC RBC AUTO-ENTMCNC: 36 G/DL (ref 31.5–36.5)
MCV RBC AUTO: 90 FL (ref 78–100)
MONOCYTES # BLD AUTO: 0.5 10E9/L (ref 0–1.3)
MONOCYTES NFR BLD AUTO: 5.7 %
NEUTROPHILS # BLD AUTO: 6 10E9/L (ref 1.6–8.3)
NEUTROPHILS NFR BLD AUTO: 67.8 %
NRBC # BLD AUTO: 0 10*3/UL
NRBC BLD AUTO-RTO: 0 /100
PLATELET # BLD AUTO: 354 10E9/L (ref 150–450)
POTASSIUM SERPL-SCNC: 3.9 MMOL/L (ref 3.4–5.3)
RBC # BLD AUTO: 5.11 10E12/L (ref 4.4–5.9)
RETICS # AUTO: 164.5 10E9/L (ref 25–95)
RETICS/RBC NFR AUTO: 3.2 % (ref 0.5–2)
SODIUM SERPL-SCNC: 141 MMOL/L (ref 133–144)
WBC # BLD AUTO: 8.9 10E9/L (ref 4–11)

## 2020-11-17 PROCEDURE — 85610 PROTHROMBIN TIME: CPT | Performed by: PHYSICIAN ASSISTANT

## 2020-11-17 PROCEDURE — 85384 FIBRINOGEN ACTIVITY: CPT | Performed by: PHYSICIAN ASSISTANT

## 2020-11-17 PROCEDURE — 250N000013 HC RX MED GY IP 250 OP 250 PS 637: Performed by: HOSPITALIST

## 2020-11-17 PROCEDURE — 36415 COLL VENOUS BLD VENIPUNCTURE: CPT | Performed by: PHYSICIAN ASSISTANT

## 2020-11-17 PROCEDURE — 120N000001 HC R&B MED SURG/OB

## 2020-11-17 PROCEDURE — 86140 C-REACTIVE PROTEIN: CPT | Performed by: PHYSICIAN ASSISTANT

## 2020-11-17 PROCEDURE — 250N000009 HC RX 250: Performed by: PHYSICIAN ASSISTANT

## 2020-11-17 PROCEDURE — 85379 FIBRIN DEGRADATION QUANT: CPT | Performed by: PHYSICIAN ASSISTANT

## 2020-11-17 PROCEDURE — 99233 SBSQ HOSP IP/OBS HIGH 50: CPT | Performed by: HOSPITALIST

## 2020-11-17 PROCEDURE — 85025 COMPLETE CBC W/AUTO DIFF WBC: CPT | Performed by: PHYSICIAN ASSISTANT

## 2020-11-17 PROCEDURE — 250N000011 HC RX IP 250 OP 636: Performed by: PHYSICIAN ASSISTANT

## 2020-11-17 PROCEDURE — 258N000003 HC RX IP 258 OP 636: Performed by: PHYSICIAN ASSISTANT

## 2020-11-17 PROCEDURE — 80048 BASIC METABOLIC PNL TOTAL CA: CPT | Performed by: PHYSICIAN ASSISTANT

## 2020-11-17 PROCEDURE — 85045 AUTOMATED RETICULOCYTE COUNT: CPT | Performed by: PHYSICIAN ASSISTANT

## 2020-11-17 PROCEDURE — 83615 LACTATE (LD) (LDH) ENZYME: CPT | Performed by: PHYSICIAN ASSISTANT

## 2020-11-17 RX ORDER — CODEINE PHOSPHATE AND GUAIFENESIN 10; 100 MG/5ML; MG/5ML
5 SOLUTION ORAL EVERY 4 HOURS PRN
Status: DISCONTINUED | OUTPATIENT
Start: 2020-11-17 | End: 2020-11-22 | Stop reason: HOSPADM

## 2020-11-17 RX ADMIN — REMDESIVIR 100 MG: 100 INJECTION, POWDER, LYOPHILIZED, FOR SOLUTION INTRAVENOUS at 16:23

## 2020-11-17 RX ADMIN — GUAIFENESIN AND CODEINE PHOSPHATE 5 ML: 10; 100 LIQUID ORAL at 11:50

## 2020-11-17 RX ADMIN — ENOXAPARIN SODIUM 40 MG: 40 INJECTION SUBCUTANEOUS at 16:18

## 2020-11-17 RX ADMIN — GUAIFENESIN AND CODEINE PHOSPHATE 5 ML: 10; 100 LIQUID ORAL at 16:18

## 2020-11-17 RX ADMIN — DEXAMETHASONE SODIUM PHOSPHATE 6 MG: 4 INJECTION, SOLUTION INTRAMUSCULAR; INTRAVENOUS at 08:11

## 2020-11-17 RX ADMIN — GUAIFENESIN AND CODEINE PHOSPHATE 5 ML: 10; 100 LIQUID ORAL at 22:24

## 2020-11-17 ASSESSMENT — ACTIVITIES OF DAILY LIVING (ADL)
ADLS_ACUITY_SCORE: 17

## 2020-11-17 NOTE — PLAN OF CARE
Vitals: Temp: 98.1  F (36.7  C) Temp src: Oral BP: 115/83 Pulse: 80   Resp: 20 SpO2: 95 % O2 Device: Oxymask Oxygen Delivery: 5 LPM  Neuro: AOx4  Respiratory: LS dim with crackles in lower lobes, leiva, dry nonproductive cough present. Pt reports that he prefers oxy mask over nasal cannula.   Cardiac/tele: WDL, denies CP  GI/: WDL  Skin: WDL  LDAs: PIV to right SL   Labs: COVID positive. Special precautions maintained  Diet: Regular   Activity: SBA  Plan: Discharge TBD. Will continue POC.

## 2020-11-17 NOTE — PROGRESS NOTES
[facility]    Hospitalist Progress Note    Date of Service (when I saw the patient): 11/17/2020  Provider:  Saul Yuen MD   Text Page  7am - 6PM       Assessment & Plan   Dakotah Zheng is a 50 year old male who has no past medical history of chronic disease..  He was admitted on 11/15/2020 with fever, diarrhea, progressive shortness of breath and nonproductive cough.  He has been diagnosed with COVID-19 infection on November 5.  He was quarantining at home until presentation to the emergency department.    #1.  COVID-19 infection, progressive symptomatology with fever, shortness of breath, cough and diarrhea.  #2.  COVID-19 pneumonia with bilateral infiltrates as documented in the chest x-ray.  #3.  Acute hypoxemic respiratory failure secondary to 1 and 2.      Plan.  Inpatient.  Regular diet as tolerated.  COVID-19 isolation.  Remdesivir per protocol to complete 5 days after loading dose.  Dexamethasone 6 mg 1 tablet daily.  Tylenol for pain or fever.  Lack of evidence of bacterial pneumonia, discontinue ceftriaxone and Zithromax.  Lovenox 40 mg subcu daily.  Albuterol MDI 2 puff every 4 hours as needed.  Oxygen per nasal cannula to keep oxygen saturation 94% or higher.  Robitussin Codeine for cough       DVT Prophylaxis: Enoxaparin (Lovenox) SQ  Code Status: Full Code    Disposition: Expected discharge once breathing room air.    Interval History   He is feeling better but cough is bothersome. Eating ok. Denies fever at the moment.  No nausea no diarrhea.  CRP down to 18 (36)    -Data reviewed today: I reviewed all new labs and imaging results over the last 24 hours.     Physical Exam   Temp: 97.7  F (36.5  C) Temp src: Oral BP: 134/87 Pulse: 86   Resp: 20 SpO2: 96 % O2 Device: Oxymask Oxygen Delivery: 5 LPM  Vitals:    11/16/20 0320   Weight: 72.6 kg (160 lb)     Vital Signs with Ranges  Temp:  [96.9  F (36.1  C)-98.1  F (36.7  C)] 97.7  F (36.5  C)  Pulse:  [] 86  Resp:  [18-20]  20  BP: (115-135)/(83-87) 134/87  SpO2:  [93 %-96 %] 96 %  I/O last 3 completed shifts:  In: 420 [P.O.:420]  Out: -     GEN:  Alert, oriented x 3, appears comfortable, NAD.  HEENT:  Normocephalic/atraumatic, no scleral icterus, no nasal discharge, mouth moist.  CV:  Regular rate and rhythm, no murmur or JVD.  S1 + S2 noted, no S3 or S4.  LUNGS:  Clear to auscultation bilaterally without rales/rhonchi/wheezing/retractions.  Symmetric chest rise on inhalation noted.  ABD:  Active bowel sounds, soft, non-tender/non-distended.  No rebound/guarding/rigidity.  EXT:  No edema or cyanosis.  No joint synovitis noted.  SKIN:  Dry to touch, no exanthems noted in the visualized areas.       Medications     - MEDICATION INSTRUCTIONS -         dexamethasone  6 mg Intravenous Daily     enoxaparin ANTICOAGULANT  40 mg Subcutaneous Q24H     remdesivir  100 mg Intravenous Q24H     sodium chloride (PF)  3 mL Intracatheter Q8H       Data   Recent Labs   Lab 11/17/20  0640 11/16/20  0716 11/15/20  2113 11/15/20  1611   WBC 8.9 4.5  --  7.1   HGB 16.6 15.8  --  16.9   MCV 90 91  --  91    371  --  402   INR 1.11 1.13 1.09  --     138  --  137   POTASSIUM 3.9 4.0 3.4 3.1*   CHLORIDE 112* 107  --  102   CO2 24 24  --  29   BUN 14 15  --  17   CR 0.76 0.77  --  1.11   ANIONGAP 5 7  --  6   KYARA 7.9* 8.0*  --  8.2*   GLC 98 128*  --  145*   ALBUMIN  --   --  2.0*  --    PROTTOTAL  --   --  6.4*  --    BILITOTAL  --   --  0.9  --    ALKPHOS  --   --  193*  --    ALT  --   --  79*  --    AST  --   --  96*  --    TROPI  --  <0.015 <0.015  --        No results found for this or any previous visit (from the past 24 hour(s)).          Disclaimer: This note consists of symbols derived from keyboarding, dictation and/or voice recognition software. As a result, there may be errors in the script that have gone undetected. Please consider this when interpreting information found in this chart.

## 2020-11-17 NOTE — PLAN OF CARE
Desats quickly to low 80's on 5LNC while up to br. Denies pain. Frequent spastic nonproductive cough. Robitussin given. Good appetite. Afebrile. Vss. LS with crackles. TAYLOR.

## 2020-11-17 NOTE — PROGRESS NOTES
[facility]    Hospitalist Progress Note    Date of Service (when I saw the patient): 11/17/2020  Provider:  Saul Yuen MD   Text Page  7am - 6PM       Assessment & Plan   Dakotah Zheng is a 50 year old male who has no past medical history of chronic disease..  He was admitted on 11/15/2020 with fever, diarrhea, progressive shortness of breath and nonproductive cough.  He has been diagnosed with COVID-19 infection on November 5.  He was quarantining at home until presentation to the emergency department.    #1.  COVID-19 infection, progressive symptomatology with fever, shortness of breath, cough and diarrhea.  #2.  COVID-19 pneumonia with bilateral infiltrates as documented in the chest x-ray.  #3.  Acute hypoxemic respiratory failure secondary to 1 and 2.      Plan.  Inpatient.  Regular diet as tolerated.  COVID-19 isolation.  Remdesivir per protocol to complete 5 days after loading dose.  Dexamethasone 6 mg 1 tablet daily.  Tylenol for pain or fever.  Lack of evidence of bacterial pneumonia, discontinue ceftriaxone and Zithromax.  Lovenox 40 mg subcu daily.  Albuterol MDI 2 puff every 4 hours as needed.  Oxygen per nasal cannula to keep oxygen saturation 94% or higher.  Robitussin Codeine for cough       DVT Prophylaxis: Enoxaparin (Lovenox) SQ  Code Status: Full Code    Disposition: Expected discharge once breathing room air.    Interval History   He is feeling better but cough is bothersome. Eating ok. Denies fever at the moment.  No nausea no diarrhea.    -Data reviewed today: I reviewed all new labs and imaging results over the last 24 hours.     Physical Exam   Temp: 97.8  F (36.6  C) Temp src: Oral BP: 113/71 Pulse: 73   Resp: 22 SpO2: 98 % O2 Device: Oxymask Oxygen Delivery: 6 LPM  Vitals:    11/16/20 0320   Weight: 72.6 kg (160 lb)     Vital Signs with Ranges  Temp:  [97  F (36.1  C)-98.1  F (36.7  C)] 97.8  F (36.6  C)  Pulse:  [73-96] 73  Resp:  [20-24] 22  BP:  (113-134)/(71-87) 113/71  SpO2:  [80 %-98 %] 98 %  I/O last 3 completed shifts:  In: 420 [P.O.:420]  Out: -     GEN:  Alert, oriented x 3, appears comfortable, NAD.  HEENT:  Normocephalic/atraumatic, no scleral icterus, no nasal discharge, mouth moist.  CV:  Regular rate and rhythm, no murmur or JVD.  S1 + S2 noted, no S3 or S4.  LUNGS:  Clear to auscultation bilaterally without rales/rhonchi/wheezing/retractions.  Symmetric chest rise on inhalation noted.  ABD:  Active bowel sounds, soft, non-tender/non-distended.  No rebound/guarding/rigidity.  EXT:  No edema or cyanosis.  No joint synovitis noted.  SKIN:  Dry to touch, no exanthems noted in the visualized areas.       Medications     - MEDICATION INSTRUCTIONS -         dexamethasone  6 mg Intravenous Daily     enoxaparin ANTICOAGULANT  40 mg Subcutaneous Q24H     remdesivir  100 mg Intravenous Q24H     sodium chloride (PF)  3 mL Intracatheter Q8H       Data   Recent Labs   Lab 11/17/20  0640 11/16/20  0716 11/15/20  2113 11/15/20  1611   WBC 8.9 4.5  --  7.1   HGB 16.6 15.8  --  16.9   MCV 90 91  --  91    371  --  402   INR 1.11 1.13 1.09  --     138  --  137   POTASSIUM 3.9 4.0 3.4 3.1*   CHLORIDE 112* 107  --  102   CO2 24 24  --  29   BUN 14 15  --  17   CR 0.76 0.77  --  1.11   ANIONGAP 5 7  --  6   KYARA 7.9* 8.0*  --  8.2*   GLC 98 128*  --  145*   ALBUMIN  --   --  2.0*  --    PROTTOTAL  --   --  6.4*  --    BILITOTAL  --   --  0.9  --    ALKPHOS  --   --  193*  --    ALT  --   --  79*  --    AST  --   --  96*  --    TROPI  --  <0.015 <0.015  --        No results found for this or any previous visit (from the past 24 hour(s)).          Disclaimer: This note consists of symbols derived from keyboarding, dictation and/or voice recognition software. As a result, there may be errors in the script that have gone undetected. Please consider this when interpreting information found in this chart.

## 2020-11-18 LAB
ALBUMIN SERPL-MCNC: 2.4 G/DL (ref 3.4–5)
ALP SERPL-CCNC: 190 U/L (ref 40–150)
ALT SERPL W P-5'-P-CCNC: 113 U/L (ref 0–70)
ANION GAP SERPL CALCULATED.3IONS-SCNC: 6 MMOL/L (ref 3–14)
AST SERPL W P-5'-P-CCNC: 88 U/L (ref 0–45)
BASOPHILS # BLD AUTO: 0 10E9/L (ref 0–0.2)
BASOPHILS NFR BLD AUTO: 0.4 %
BILIRUB DIRECT SERPL-MCNC: 0.3 MG/DL (ref 0–0.2)
BILIRUB SERPL-MCNC: 0.8 MG/DL (ref 0.2–1.3)
BUN SERPL-MCNC: 15 MG/DL (ref 7–30)
CALCIUM SERPL-MCNC: 7.9 MG/DL (ref 8.5–10.1)
CHLORIDE SERPL-SCNC: 110 MMOL/L (ref 94–109)
CO2 SERPL-SCNC: 25 MMOL/L (ref 20–32)
CREAT SERPL-MCNC: 0.78 MG/DL (ref 0.66–1.25)
CRP SERPL-MCNC: 10.6 MG/L (ref 0–8)
D DIMER PPP FEU-MCNC: 3.4 UG/ML FEU (ref 0–0.5)
DIFFERENTIAL METHOD BLD: NORMAL
EOSINOPHIL # BLD AUTO: 0 10E9/L (ref 0–0.7)
EOSINOPHIL NFR BLD AUTO: 0.2 %
ERYTHROCYTE [DISTWIDTH] IN BLOOD BY AUTOMATED COUNT: 13.4 % (ref 10–15)
FIBRINOGEN PPP-MCNC: 529 MG/DL (ref 200–420)
GFR SERPL CREATININE-BSD FRML MDRD: >90 ML/MIN/{1.73_M2}
GLUCOSE SERPL-MCNC: 91 MG/DL (ref 70–99)
HCT VFR BLD AUTO: 44.7 % (ref 40–53)
HGB BLD-MCNC: 15.6 G/DL (ref 13.3–17.7)
IMM GRANULOCYTES # BLD: 0.1 10E9/L (ref 0–0.4)
IMM GRANULOCYTES NFR BLD: 1 %
INR PPP: 1.08 (ref 0.86–1.14)
LDH SERPL L TO P-CCNC: 290 U/L (ref 85–227)
LYMPHOCYTES # BLD AUTO: 1.8 10E9/L (ref 0.8–5.3)
LYMPHOCYTES NFR BLD AUTO: 21.4 %
MCH RBC QN AUTO: 31.8 PG (ref 26.5–33)
MCHC RBC AUTO-ENTMCNC: 34.9 G/DL (ref 31.5–36.5)
MCV RBC AUTO: 91 FL (ref 78–100)
MONOCYTES # BLD AUTO: 0.5 10E9/L (ref 0–1.3)
MONOCYTES NFR BLD AUTO: 6.3 %
NEUTROPHILS # BLD AUTO: 5.8 10E9/L (ref 1.6–8.3)
NEUTROPHILS NFR BLD AUTO: 70.7 %
NRBC # BLD AUTO: 0 10*3/UL
NRBC BLD AUTO-RTO: 0 /100
PLATELET # BLD AUTO: 406 10E9/L (ref 150–450)
POTASSIUM SERPL-SCNC: 3.9 MMOL/L (ref 3.4–5.3)
PROT SERPL-MCNC: 6.9 G/DL (ref 6.8–8.8)
RBC # BLD AUTO: 4.9 10E12/L (ref 4.4–5.9)
RETICS # AUTO: 181.3 10E9/L (ref 25–95)
RETICS/RBC NFR AUTO: 3.7 % (ref 0.5–2)
SODIUM SERPL-SCNC: 141 MMOL/L (ref 133–144)
TROPONIN I SERPL-MCNC: <0.015 UG/L (ref 0–0.04)
WBC # BLD AUTO: 8.2 10E9/L (ref 4–11)

## 2020-11-18 PROCEDURE — 99233 SBSQ HOSP IP/OBS HIGH 50: CPT | Performed by: HOSPITALIST

## 2020-11-18 PROCEDURE — 85049 AUTOMATED PLATELET COUNT: CPT | Performed by: PHYSICIAN ASSISTANT

## 2020-11-18 PROCEDURE — 258N000003 HC RX IP 258 OP 636: Performed by: PHYSICIAN ASSISTANT

## 2020-11-18 PROCEDURE — 85379 FIBRIN DEGRADATION QUANT: CPT | Performed by: PHYSICIAN ASSISTANT

## 2020-11-18 PROCEDURE — 250N000013 HC RX MED GY IP 250 OP 250 PS 637: Performed by: HOSPITALIST

## 2020-11-18 PROCEDURE — 120N000001 HC R&B MED SURG/OB

## 2020-11-18 PROCEDURE — 80048 BASIC METABOLIC PNL TOTAL CA: CPT | Performed by: PHYSICIAN ASSISTANT

## 2020-11-18 PROCEDURE — 36415 COLL VENOUS BLD VENIPUNCTURE: CPT | Performed by: PHYSICIAN ASSISTANT

## 2020-11-18 PROCEDURE — 85384 FIBRINOGEN ACTIVITY: CPT | Performed by: PHYSICIAN ASSISTANT

## 2020-11-18 PROCEDURE — 86140 C-REACTIVE PROTEIN: CPT | Performed by: PHYSICIAN ASSISTANT

## 2020-11-18 PROCEDURE — 83615 LACTATE (LD) (LDH) ENZYME: CPT | Performed by: PHYSICIAN ASSISTANT

## 2020-11-18 PROCEDURE — 85025 COMPLETE CBC W/AUTO DIFF WBC: CPT | Performed by: PHYSICIAN ASSISTANT

## 2020-11-18 PROCEDURE — 84484 ASSAY OF TROPONIN QUANT: CPT | Performed by: PHYSICIAN ASSISTANT

## 2020-11-18 PROCEDURE — 250N000011 HC RX IP 250 OP 636: Performed by: PHYSICIAN ASSISTANT

## 2020-11-18 PROCEDURE — 85045 AUTOMATED RETICULOCYTE COUNT: CPT | Performed by: PHYSICIAN ASSISTANT

## 2020-11-18 PROCEDURE — 80076 HEPATIC FUNCTION PANEL: CPT | Performed by: PHYSICIAN ASSISTANT

## 2020-11-18 PROCEDURE — 250N000009 HC RX 250: Performed by: PHYSICIAN ASSISTANT

## 2020-11-18 PROCEDURE — 85610 PROTHROMBIN TIME: CPT | Performed by: PHYSICIAN ASSISTANT

## 2020-11-18 RX ADMIN — DEXAMETHASONE SODIUM PHOSPHATE 6 MG: 4 INJECTION, SOLUTION INTRAMUSCULAR; INTRAVENOUS at 07:56

## 2020-11-18 RX ADMIN — ENOXAPARIN SODIUM 40 MG: 40 INJECTION SUBCUTANEOUS at 16:32

## 2020-11-18 RX ADMIN — GUAIFENESIN AND CODEINE PHOSPHATE 5 ML: 10; 100 LIQUID ORAL at 16:41

## 2020-11-18 RX ADMIN — GUAIFENESIN AND CODEINE PHOSPHATE 5 ML: 10; 100 LIQUID ORAL at 06:41

## 2020-11-18 RX ADMIN — REMDESIVIR 100 MG: 100 INJECTION, POWDER, LYOPHILIZED, FOR SOLUTION INTRAVENOUS at 16:26

## 2020-11-18 ASSESSMENT — ACTIVITIES OF DAILY LIVING (ADL)
ADLS_ACUITY_SCORE: 17

## 2020-11-18 NOTE — PLAN OF CARE
Vitals: Temp: 98.2  F (36.8  C) Temp src: Oral BP: 121/69 Pulse: 72   Resp: 24 SpO2: 97 % O2 Device: Oxymask Oxygen Delivery: 5 LPM  Neuro: AO4  Respiratory: LS dim, supplemental O2 decreased from 6L to 5L. Pt tolerating well. Dry nonproductive cough present.   Cardiac/tele: WDL, denies chest pain   GI/: WDL  Skin: WDL   LDAs: PIV to right SL   Labs: COVID positive. Special precautions maintained.   Diet: Regular  Activity: SBA  Plan: Discharge once tolerating room air. Will continue POC.

## 2020-11-18 NOTE — PLAN OF CARE
Vss A&OX4 up sba to br. Sats 97% on 6LNC. Desats to 80's up to br with o2 on. Denies pain. Cough improved some with robitussin. Denies any sores or wounds on skin.

## 2020-11-18 NOTE — PLAN OF CARE
Pt A&O X4. VSS. 6L oxmask, SpO2 92-94%. LS diminished in LL bilat. Pt continues to have dry cough, robitussin AC given x2 last dose @ 2224. Pt SBA, desats with activities. Continue to wean oxygen. Regular diet. RPIV saline locked. Pt to discharge when breathing room Air.

## 2020-11-18 NOTE — PROGRESS NOTES
[facility]    Hospitalist Progress Note    Date of Service (when I saw the patient): 11/18/2020  Provider:  Saul Yuen MD   Text Page  7am - 6PM       Assessment & Plan   Dakotah Zheng is a 50 year old male who has no past medical history of chronic disease..  He was admitted on 11/15/2020 with fever, diarrhea, progressive shortness of breath and nonproductive cough.  He has been diagnosed with COVID-19 infection on November 5.  He was quarantining at home until presentation to the emergency department.    #1.  COVID-19 infection, progressive symptomatology with fever, shortness of breath, cough and diarrhea.  #2.  COVID-19 pneumonia with bilateral infiltrates as documented in the chest x-ray.  #3.  Acute hypoxemic respiratory failure secondary to 1 and 2.      Plan.  Inpatient.  Regular diet as tolerated.  COVID-19 isolation.  Remdesivir per protocol to complete 5 days after loading dose.  Dexamethasone 6 mg 1 tablet daily.  Tylenol for pain or fever.  Lack of evidence of bacterial pneumonia, discontinue ceftriaxone and Zithromax.  Lovenox 40 mg subcu daily.  Albuterol MDI 2 puff every 4 hours as needed.  Oxygen per nasal cannula to keep oxygen saturation 94% or higher.  Robitussin Codeine for cough       DVT Prophylaxis: Enoxaparin (Lovenox) SQ  Code Status: Full Code    Disposition: Expected discharge once breathing room air.    Interval History   He is stable and improving, cough is still bothersome. Eating .Afebrile. CRP is down trending. Desaturates on exertion but not when at rest.     -Data reviewed today: I reviewed all new labs and imaging results over the last 24 hours.     Physical Exam   Temp: 97.7  F (36.5  C) Temp src: Oral BP: 122/76 Pulse: 93   Resp: 24 SpO2: 91 % O2 Device: Oxymask Oxygen Delivery: 5 LPM  Vitals:    11/16/20 0320   Weight: 72.6 kg (160 lb)     Vital Signs with Ranges  Temp:  [97  F (36.1  C)-98.2  F (36.8  C)] 97.7  F (36.5  C)  Pulse:  [72-96] 93  Resp:   [22-24] 24  BP: (113-133)/(69-80) 122/76  SpO2:  [80 %-98 %] 91 %  I/O last 3 completed shifts:  In: 243 [P.O.:240; I.V.:3]  Out: -     GEN:  Alert, oriented x 3, appears comfortable, NAD.  HEENT:  Normocephalic/atraumatic, no scleral icterus, no nasal discharge, mouth moist.  CV:  Regular rate and rhythm, no murmur or JVD.  S1 + S2 noted, no S3 or S4.  LUNGS:  Clear to auscultation bilaterally without rales/rhonchi/wheezing/retractions.  Symmetric chest rise on inhalation noted.  ABD:  Active bowel sounds, soft, non-tender/non-distended.  No rebound/guarding/rigidity.  EXT:  No edema or cyanosis.  No joint synovitis noted.  SKIN:  Dry to touch, no exanthems noted in the visualized areas.       Medications     - MEDICATION INSTRUCTIONS -         dexamethasone  6 mg Intravenous Daily     enoxaparin ANTICOAGULANT  40 mg Subcutaneous Q24H     remdesivir  100 mg Intravenous Q24H     sodium chloride (PF)  3 mL Intracatheter Q8H       Data   Recent Labs   Lab 11/18/20  0804 11/17/20  0640 11/16/20  0716 11/15/20  2113   WBC 8.2 8.9 4.5  --    HGB 15.6 16.6 15.8  --    MCV 91 90 91  --     354 371  --    INR 1.08 1.11 1.13 1.09    141 138  --    POTASSIUM 3.9 3.9 4.0 3.4   CHLORIDE 110* 112* 107  --    CO2 25 24 24  --    BUN 15 14 15  --    CR 0.78 0.76 0.77  --    ANIONGAP 6 5 7  --    KYARA 7.9* 7.9* 8.0*  --    GLC 91 98 128*  --    ALBUMIN 2.4*  --   --  2.0*   PROTTOTAL 6.9  --   --  6.4*   BILITOTAL 0.8  --   --  0.9   ALKPHOS 190*  --   --  193*   *  --   --  79*   AST 88*  --   --  96*   TROPI <0.015  --  <0.015 <0.015       No results found for this or any previous visit (from the past 24 hour(s)).          Disclaimer: This note consists of symbols derived from keyboarding, dictation and/or voice recognition software. As a result, there may be errors in the script that have gone undetected. Please consider this when interpreting information found in this chart.

## 2020-11-19 LAB
ALBUMIN SERPL-MCNC: 2.6 G/DL (ref 3.4–5)
ALP SERPL-CCNC: 202 U/L (ref 40–150)
ALT SERPL W P-5'-P-CCNC: 156 U/L (ref 0–70)
ANION GAP SERPL CALCULATED.3IONS-SCNC: 5 MMOL/L (ref 3–14)
AST SERPL W P-5'-P-CCNC: 79 U/L (ref 0–45)
BASOPHILS # BLD AUTO: 0.1 10E9/L (ref 0–0.2)
BASOPHILS NFR BLD AUTO: 0.5 %
BILIRUB DIRECT SERPL-MCNC: 0.2 MG/DL (ref 0–0.2)
BILIRUB SERPL-MCNC: 0.7 MG/DL (ref 0.2–1.3)
BUN SERPL-MCNC: 16 MG/DL (ref 7–30)
CALCIUM SERPL-MCNC: 8.2 MG/DL (ref 8.5–10.1)
CHLORIDE SERPL-SCNC: 106 MMOL/L (ref 94–109)
CO2 SERPL-SCNC: 29 MMOL/L (ref 20–32)
CREAT SERPL-MCNC: 0.79 MG/DL (ref 0.66–1.25)
CRP SERPL-MCNC: 10.8 MG/L (ref 0–8)
D DIMER PPP FEU-MCNC: 2.8 UG/ML FEU (ref 0–0.5)
DIFFERENTIAL METHOD BLD: NORMAL
EOSINOPHIL # BLD AUTO: 0 10E9/L (ref 0–0.7)
EOSINOPHIL NFR BLD AUTO: 0.2 %
ERYTHROCYTE [DISTWIDTH] IN BLOOD BY AUTOMATED COUNT: 13.4 % (ref 10–15)
FIBRINOGEN PPP-MCNC: 478 MG/DL (ref 200–420)
GFR SERPL CREATININE-BSD FRML MDRD: >90 ML/MIN/{1.73_M2}
GLUCOSE SERPL-MCNC: 115 MG/DL (ref 70–99)
HCT VFR BLD AUTO: 45.7 % (ref 40–53)
HGB BLD-MCNC: 16.7 G/DL (ref 13.3–17.7)
IMM GRANULOCYTES # BLD: 0.1 10E9/L (ref 0–0.4)
IMM GRANULOCYTES NFR BLD: 1.4 %
INR PPP: 1.12 (ref 0.86–1.14)
LDH SERPL L TO P-CCNC: 227 U/L (ref 85–227)
LYMPHOCYTES # BLD AUTO: 2.6 10E9/L (ref 0.8–5.3)
LYMPHOCYTES NFR BLD AUTO: 27.4 %
MCH RBC QN AUTO: 32.7 PG (ref 26.5–33)
MCHC RBC AUTO-ENTMCNC: 36.5 G/DL (ref 31.5–36.5)
MCV RBC AUTO: 90 FL (ref 78–100)
MONOCYTES # BLD AUTO: 0.6 10E9/L (ref 0–1.3)
MONOCYTES NFR BLD AUTO: 6.3 %
NEUTROPHILS # BLD AUTO: 6.1 10E9/L (ref 1.6–8.3)
NEUTROPHILS NFR BLD AUTO: 64.2 %
NRBC # BLD AUTO: 0 10*3/UL
NRBC BLD AUTO-RTO: 0 /100
PLATELET # BLD AUTO: 385 10E9/L (ref 150–450)
POTASSIUM SERPL-SCNC: 4 MMOL/L (ref 3.4–5.3)
PROT SERPL-MCNC: 6.9 G/DL (ref 6.8–8.8)
RBC # BLD AUTO: 5.1 10E12/L (ref 4.4–5.9)
RETICS # AUTO: 194.3 10E9/L (ref 25–95)
RETICS/RBC NFR AUTO: 3.8 % (ref 0.5–2)
SODIUM SERPL-SCNC: 140 MMOL/L (ref 133–144)
WBC # BLD AUTO: 9.5 10E9/L (ref 4–11)

## 2020-11-19 PROCEDURE — 85610 PROTHROMBIN TIME: CPT | Performed by: PHYSICIAN ASSISTANT

## 2020-11-19 PROCEDURE — 250N000009 HC RX 250: Performed by: PHYSICIAN ASSISTANT

## 2020-11-19 PROCEDURE — 250N000011 HC RX IP 250 OP 636: Performed by: PHYSICIAN ASSISTANT

## 2020-11-19 PROCEDURE — 85384 FIBRINOGEN ACTIVITY: CPT | Performed by: PHYSICIAN ASSISTANT

## 2020-11-19 PROCEDURE — 80048 BASIC METABOLIC PNL TOTAL CA: CPT | Performed by: PHYSICIAN ASSISTANT

## 2020-11-19 PROCEDURE — 83615 LACTATE (LD) (LDH) ENZYME: CPT | Performed by: PHYSICIAN ASSISTANT

## 2020-11-19 PROCEDURE — 250N000013 HC RX MED GY IP 250 OP 250 PS 637: Performed by: HOSPITALIST

## 2020-11-19 PROCEDURE — 85025 COMPLETE CBC W/AUTO DIFF WBC: CPT | Performed by: PHYSICIAN ASSISTANT

## 2020-11-19 PROCEDURE — 86140 C-REACTIVE PROTEIN: CPT | Performed by: PHYSICIAN ASSISTANT

## 2020-11-19 PROCEDURE — 36415 COLL VENOUS BLD VENIPUNCTURE: CPT | Performed by: PHYSICIAN ASSISTANT

## 2020-11-19 PROCEDURE — 120N000001 HC R&B MED SURG/OB

## 2020-11-19 PROCEDURE — 258N000003 HC RX IP 258 OP 636: Performed by: PHYSICIAN ASSISTANT

## 2020-11-19 PROCEDURE — 85379 FIBRIN DEGRADATION QUANT: CPT | Performed by: PHYSICIAN ASSISTANT

## 2020-11-19 PROCEDURE — 85045 AUTOMATED RETICULOCYTE COUNT: CPT | Performed by: PHYSICIAN ASSISTANT

## 2020-11-19 PROCEDURE — 80076 HEPATIC FUNCTION PANEL: CPT | Performed by: PHYSICIAN ASSISTANT

## 2020-11-19 PROCEDURE — 250N000013 HC RX MED GY IP 250 OP 250 PS 637: Performed by: PHYSICIAN ASSISTANT

## 2020-11-19 PROCEDURE — 99233 SBSQ HOSP IP/OBS HIGH 50: CPT | Performed by: INTERNAL MEDICINE

## 2020-11-19 RX ADMIN — DEXAMETHASONE SODIUM PHOSPHATE 6 MG: 4 INJECTION, SOLUTION INTRAMUSCULAR; INTRAVENOUS at 08:08

## 2020-11-19 RX ADMIN — GUAIFENESIN AND CODEINE PHOSPHATE 5 ML: 10; 100 LIQUID ORAL at 10:17

## 2020-11-19 RX ADMIN — GUAIFENESIN AND CODEINE PHOSPHATE 5 ML: 10; 100 LIQUID ORAL at 00:01

## 2020-11-19 RX ADMIN — REMDESIVIR 100 MG: 100 INJECTION, POWDER, LYOPHILIZED, FOR SOLUTION INTRAVENOUS at 17:26

## 2020-11-19 RX ADMIN — ALBUTEROL SULFATE 2 PUFF: 90 AEROSOL, METERED RESPIRATORY (INHALATION) at 13:44

## 2020-11-19 RX ADMIN — GUAIFENESIN AND CODEINE PHOSPHATE 5 ML: 10; 100 LIQUID ORAL at 18:25

## 2020-11-19 RX ADMIN — ENOXAPARIN SODIUM 40 MG: 40 INJECTION SUBCUTANEOUS at 17:28

## 2020-11-19 RX ADMIN — ALBUTEROL SULFATE 2 PUFF: 90 AEROSOL, METERED RESPIRATORY (INHALATION) at 17:25

## 2020-11-19 RX ADMIN — GUAIFENESIN AND CODEINE PHOSPHATE 5 ML: 10; 100 LIQUID ORAL at 06:11

## 2020-11-19 RX ADMIN — GUAIFENESIN AND CODEINE PHOSPHATE 5 ML: 10; 100 LIQUID ORAL at 14:33

## 2020-11-19 ASSESSMENT — ACTIVITIES OF DAILY LIVING (ADL)
ADLS_ACUITY_SCORE: 17

## 2020-11-19 NOTE — PLAN OF CARE
Presentation/Diagnosis:Positive covid   Labs/Protocols:K 4.0, INR 1.12, Creat 0.79  Vitals:VSS, no complaints of pain this shift   Respiratory:2L NC, sating 93-96%, drops to 86% when up and moving   Neuro:A&O  GI/:WNL  Skin:intact   LDAs:PIV SL  Diet:Regular diet   Activity:IND  Teaching:pt educated on plan of care  Plan:cont. To wean off oxygen, cont IV remdsivier.  Once on RA and stable pt can discharge. PRN robitussin and Inhaler given. Pt has chronic dry cough.

## 2020-11-19 NOTE — PROGRESS NOTES
[facility]    Hospitalist Progress Note    Date of Service (when I saw the patient): 11/19/2020  Provider: Rodney Coreas MD     Assessment & Plan   Dakotah Zheng is a 50 year old male who has no past medical history of chronic disease..  He was admitted on 11/15/2020 with fever, diarrhea, progressive shortness of breath and nonproductive cough. He has been diagnosed with COVID-19 infection on November 5. He was quarantining at home until presentation to the emergency department.    1.  COVID-19 infection, progressive symptomatology with fever, shortness of breath, cough and diarrhea.  -Regular diet as tolerated. COVID-19 isolation.  -Remdesivir per protocol to complete 5 days after loading dose.  -Dexamethasone 6 mg 1 tablet daily.  Tylenol for pain or fever.  Lack of evidence of bacterial pneumonia, discontinue ceftriaxone and Zithromax.  Robitussin Codeine for cough     2.  COVID-19 pneumonia with bilateral infiltrates as documented in the chest x-ray.  -Oxygen per nasal cannula to keep oxygen saturation 94% or higher. Albuterol MDI 2 puff every 4 hours as needed.    3.  Acute hypoxemic respiratory failure secondary to 1 and 2.    DVT Prophylaxis: Enoxaparin (Lovenox) subcutaneous    Code Status: Full Code    Disposition: Expected discharge once breathing room air.    Interval History   He is stable and improving, denies nausea or vomiting.    -Data reviewed today: I reviewed all new labs and imaging results over the last 24 hours.     Physical Exam   Temp: 97.6  F (36.4  C) Temp src: Oral BP: 114/74 Pulse: 98   Resp: 20 SpO2: 96 % O2 Device: Nasal cannula Oxygen Delivery: 2 LPM  Vitals:    11/16/20 0320   Weight: 72.6 kg (160 lb)     Vital Signs with Ranges  Temp:  [97.5  F (36.4  C)-97.6  F (36.4  C)] 97.6  F (36.4  C)  Pulse:  [75-98] 98  Resp:  [20-34] 20  BP: (114-124)/(71-74) 114/74  SpO2:  [88 %-98 %] 96 %  I/O last 3 completed shifts:  In: 3 [I.V.:3]  Out: -     GEN:  Alert,  oriented x 3, appears comfortable, NAD.  HEENT:  Normocephalic/atraumatic, no scleral icterus, no nasal discharge, mouth moist.  CV:  Regular rate and rhythm, no murmur or JVD.  S1 + S2 noted, no S3 or S4.  LUNGS:  Clear to auscultation bilaterally without rales/rhonchi/wheezing/retractions.  Symmetric chest rise on inhalation noted.  ABD:  Active bowel sounds, soft, non-tender/non-distended.  No rebound/guarding/rigidity.  EXT:  No edema or cyanosis.  No joint synovitis noted.  SKIN:  Dry to touch, no exanthems noted in the visualized areas.       Medications     - MEDICATION INSTRUCTIONS -         dexamethasone  6 mg Intravenous Daily     enoxaparin ANTICOAGULANT  40 mg Subcutaneous Q24H     remdesivir  100 mg Intravenous Q24H     sodium chloride (PF)  3 mL Intracatheter Q8H       Data   Recent Labs   Lab 11/19/20  0656 11/18/20  0804 11/17/20  0640 11/16/20  0716 11/15/20  2113   WBC 9.5 8.2 8.9 4.5  --    HGB 16.7 15.6 16.6 15.8  --    MCV 90 91 90 91  --     406 354 371  --    INR 1.12 1.08 1.11 1.13 1.09    141 141 138  --    POTASSIUM 4.0 3.9 3.9 4.0 3.4   CHLORIDE 106 110* 112* 107  --    CO2 29 25 24 24  --    BUN 16 15 14 15  --    CR 0.79 0.78 0.76 0.77  --    ANIONGAP 5 6 5 7  --    KYARA 8.2* 7.9* 7.9* 8.0*  --    * 91 98 128*  --    ALBUMIN 2.6* 2.4*  --   --  2.0*   PROTTOTAL 6.9 6.9  --   --  6.4*   BILITOTAL 0.7 0.8  --   --  0.9   ALKPHOS 202* 190*  --   --  193*   * 113*  --   --  79*   AST 79* 88*  --   --  96*   TROPI  --  <0.015  --  <0.015 <0.015       No results found for this or any previous visit (from the past 24 hour(s)).          Disclaimer: This note consists of symbols derived from keyboarding, dictation and/or voice recognition software. As a result, there may be errors in the script that have gone undetected. Please consider this when interpreting information found in this chart.

## 2020-11-19 NOTE — PLAN OF CARE
VSS, SPO2 94% 1L NC. LS: Mau.LL Diminished. Non productive cough, Robitussin PRN given. Pt on Remdesivir IV. Pt SBA,regular diet. PIV RAC saline locked. POC: wean off oxygen Pt discharge when room Air breathing.

## 2020-11-20 PROCEDURE — 99232 SBSQ HOSP IP/OBS MODERATE 35: CPT | Performed by: INTERNAL MEDICINE

## 2020-11-20 PROCEDURE — 120N000001 HC R&B MED SURG/OB

## 2020-11-20 PROCEDURE — 250N000011 HC RX IP 250 OP 636: Performed by: PHYSICIAN ASSISTANT

## 2020-11-20 PROCEDURE — 250N000013 HC RX MED GY IP 250 OP 250 PS 637: Performed by: HOSPITALIST

## 2020-11-20 RX ADMIN — DEXAMETHASONE SODIUM PHOSPHATE 6 MG: 4 INJECTION, SOLUTION INTRAMUSCULAR; INTRAVENOUS at 08:04

## 2020-11-20 RX ADMIN — GUAIFENESIN AND CODEINE PHOSPHATE 5 ML: 10; 100 LIQUID ORAL at 11:22

## 2020-11-20 RX ADMIN — ENOXAPARIN SODIUM 40 MG: 40 INJECTION SUBCUTANEOUS at 16:10

## 2020-11-20 RX ADMIN — GUAIFENESIN AND CODEINE PHOSPHATE 5 ML: 10; 100 LIQUID ORAL at 05:42

## 2020-11-20 RX ADMIN — ALBUTEROL SULFATE 2 PUFF: 90 AEROSOL, METERED RESPIRATORY (INHALATION) at 13:29

## 2020-11-20 RX ADMIN — GUAIFENESIN AND CODEINE PHOSPHATE 5 ML: 10; 100 LIQUID ORAL at 23:34

## 2020-11-20 RX ADMIN — GUAIFENESIN AND CODEINE PHOSPHATE 5 ML: 10; 100 LIQUID ORAL at 00:03

## 2020-11-20 RX ADMIN — GUAIFENESIN AND CODEINE PHOSPHATE 5 ML: 10; 100 LIQUID ORAL at 16:09

## 2020-11-20 ASSESSMENT — ACTIVITIES OF DAILY LIVING (ADL)
ADLS_ACUITY_SCORE: 17

## 2020-11-20 NOTE — PLAN OF CARE
Presentation/Diagnosis:Covid positive   Labs/Protocols:K4.0, CRP 10.8  Vitals:VSS, no complaints of pain this shift   Respiratory:0.5L NC sating 93-94%. Pt was on room air sating 93%,. Put pt on 0.5L for when pt has coughing episode.   Neuro:A&O  GI/:WNL, BM this shift   Skin:Intact   LDAs:PIV SL  Diet:Regular   Activity:IND  Teaching:pt educated on plan of care  Plan:Cont to wean off oxygen, possible discharge tomorrow if pt is tolerating room air and is stable.

## 2020-11-20 NOTE — PLAN OF CARE
Vitals: Temp: 97.9  F (36.6  C) Temp src: Oral BP: 101/69 Pulse: 97   Resp: 24 SpO2: 93 % O2 Device: Nasal cannula Oxygen Delivery: 1 LPM  Neuro: Ao4   Respiratory: Benedict but improving from previous days, LS clr, intermittent cough present. Robitussin x2, effective. O2 decreased from 1.5 to 1L nc.   Cardiac/tele: WDL  GI/: WDL  Skin: WDL  LDAs: PIV to right SL   Labs: COVID positive. Special precautions maintained.   Diet: Regular   Activity: Ind in room   Plan: Plan to discharge once on room air. Will continue POC.

## 2020-11-20 NOTE — PLAN OF CARE
Diagnosis. Pneumonia  Labs/Protocol. INR- 1.12  Vitals. VSS  Respiratory. Patient weaned from 2L to 1.5L. Tolerating well.   Neuro. WDl  GI/. WDL  Skin. WDL  LDA's. Right PIV saline locked.   Diet. Regular  Activity. Independent   Plan. Received Robitussin for cough. Continue to monitor patients Oxygen.

## 2020-11-20 NOTE — PROGRESS NOTES
[facility]    Hospitalist Progress Note    Date of Service (when I saw the patient): 11/20/2020  Provider: Rodney Coreas MD     Assessment & Plan   Dakotah Zheng is a 50 year old male who has no past medical history of chronic disease..  He was admitted on 11/15/2020 with fever, diarrhea, progressive shortness of breath and nonproductive cough. He has been diagnosed with COVID-19 infection on November 5. He was quarantining at home until presentation to the emergency department.    1.  COVID-19 infection, progressive symptomatology with fever, shortness of breath, cough and diarrhea.  -Regular diet as tolerated. COVID-19 isolation.  -Remdesivir per protocol to complete 5 days after loading dose.  -Dexamethasone 6 mg 1 tablet daily.  Tylenol for pain or fever.  No evidence of evidence of bacterial pneumonia, discontinued ceftriaxone and Zithromax.  Robitussin Codeine for cough   Wean off oxygen as able    2.  COVID-19 pneumonia with bilateral infiltrates as documented in the chest x-ray.  -Oxygen per nasal cannula to keep oxygen saturation 94% or higher. Albuterol MDI 2 puff every 4 hours as needed.    3.  Acute hypoxemic respiratory failure secondary to 1 and 2.    DVT Prophylaxis: Enoxaparin (Lovenox) subcutaneous    Code Status: Full Code    Disposition: Expected discharge once breathing room air.    Interval History   He is stable and improving, denies nausea or vomiting. Stated that cough is improving    -Data reviewed today: I reviewed all new labs and imaging results over the last 24 hours.     Physical Exam   Temp: 97.6  F (36.4  C) Temp src: Oral BP: 115/74 Pulse: 88   Resp: 18 SpO2: 93 % O2 Device: Nasal cannula Oxygen Delivery: 1/2 LPM  Vitals:    11/16/20 0320   Weight: 72.6 kg (160 lb)     Vital Signs with Ranges  Temp:  [97.6  F (36.4  C)-97.9  F (36.6  C)] 97.6  F (36.4  C)  Pulse:  [82-97] 88  Resp:  [18-24] 18  BP: (101-115)/(67-74) 115/74  SpO2:  [92 %-97 %] 93 %  I/O last 3  completed shifts:  In: 3 [I.V.:3]  Out: -     GEN:  Alert, oriented x 3, appears comfortable, NAD.  HEENT:  Normocephalic/atraumatic, no scleral icterus, no nasal discharge, mouth moist.  CV:  Regular rate and rhythm, no murmur or JVD.  S1 + S2 noted, no S3 or S4.  LUNGS:  Clear to auscultation bilaterally without rales/rhonchi/wheezing/retractions.  Symmetric chest rise on inhalation noted.  ABD:  Active bowel sounds, soft, non-tender/non-distended.  No rebound/guarding/rigidity.  EXT:  No edema or cyanosis.  No joint synovitis noted.  SKIN:  Dry to touch, no exanthems noted in the visualized areas.       Medications     - MEDICATION INSTRUCTIONS -         dexamethasone  6 mg Intravenous Daily     enoxaparin ANTICOAGULANT  40 mg Subcutaneous Q24H     sodium chloride (PF)  3 mL Intracatheter Q8H       Data   Recent Labs   Lab 11/19/20  0656 11/18/20  0804 11/17/20  0640 11/16/20  0716 11/15/20  2113   WBC 9.5 8.2 8.9 4.5  --    HGB 16.7 15.6 16.6 15.8  --    MCV 90 91 90 91  --     406 354 371  --    INR 1.12 1.08 1.11 1.13 1.09    141 141 138  --    POTASSIUM 4.0 3.9 3.9 4.0 3.4   CHLORIDE 106 110* 112* 107  --    CO2 29 25 24 24  --    BUN 16 15 14 15  --    CR 0.79 0.78 0.76 0.77  --    ANIONGAP 5 6 5 7  --    KYARA 8.2* 7.9* 7.9* 8.0*  --    * 91 98 128*  --    ALBUMIN 2.6* 2.4*  --   --  2.0*   PROTTOTAL 6.9 6.9  --   --  6.4*   BILITOTAL 0.7 0.8  --   --  0.9   ALKPHOS 202* 190*  --   --  193*   * 113*  --   --  79*   AST 79* 88*  --   --  96*   TROPI  --  <0.015  --  <0.015 <0.015       No results found for this or any previous visit (from the past 24 hour(s)).          Disclaimer: This note consists of symbols derived from keyboarding, dictation and/or voice recognition software. As a result, there may be errors in the script that have gone undetected. Please consider this when interpreting information found in this chart.

## 2020-11-21 LAB
BACTERIA SPEC CULT: NO GROWTH
BACTERIA SPEC CULT: NO GROWTH
Lab: NORMAL
PLATELET # BLD AUTO: 405 10E9/L (ref 150–450)
SPECIMEN SOURCE: NORMAL
SPECIMEN SOURCE: NORMAL

## 2020-11-21 PROCEDURE — 99233 SBSQ HOSP IP/OBS HIGH 50: CPT | Performed by: INTERNAL MEDICINE

## 2020-11-21 PROCEDURE — 120N000001 HC R&B MED SURG/OB

## 2020-11-21 PROCEDURE — 250N000013 HC RX MED GY IP 250 OP 250 PS 637: Performed by: HOSPITALIST

## 2020-11-21 PROCEDURE — 85049 AUTOMATED PLATELET COUNT: CPT | Performed by: INTERNAL MEDICINE

## 2020-11-21 PROCEDURE — 250N000011 HC RX IP 250 OP 636: Performed by: PHYSICIAN ASSISTANT

## 2020-11-21 PROCEDURE — 36415 COLL VENOUS BLD VENIPUNCTURE: CPT | Performed by: INTERNAL MEDICINE

## 2020-11-21 RX ORDER — ALBUTEROL SULFATE 90 UG/1
2 AEROSOL, METERED RESPIRATORY (INHALATION) EVERY 4 HOURS PRN
Qty: 1 INHALER | Refills: 0 | Status: SHIPPED | OUTPATIENT
Start: 2020-11-21

## 2020-11-21 RX ORDER — CODEINE PHOSPHATE AND GUAIFENESIN 10; 100 MG/5ML; MG/5ML
5 SOLUTION ORAL EVERY 4 HOURS PRN
Qty: 118 ML | Refills: 0 | Status: SHIPPED | OUTPATIENT
Start: 2020-11-21

## 2020-11-21 RX ADMIN — ENOXAPARIN SODIUM 40 MG: 40 INJECTION SUBCUTANEOUS at 18:24

## 2020-11-21 RX ADMIN — ALBUTEROL SULFATE 2 PUFF: 90 AEROSOL, METERED RESPIRATORY (INHALATION) at 23:31

## 2020-11-21 RX ADMIN — GUAIFENESIN AND CODEINE PHOSPHATE 5 ML: 10; 100 LIQUID ORAL at 04:29

## 2020-11-21 RX ADMIN — ALBUTEROL SULFATE 2 PUFF: 90 AEROSOL, METERED RESPIRATORY (INHALATION) at 18:24

## 2020-11-21 RX ADMIN — ALBUTEROL SULFATE 2 PUFF: 90 AEROSOL, METERED RESPIRATORY (INHALATION) at 04:29

## 2020-11-21 RX ADMIN — GUAIFENESIN AND CODEINE PHOSPHATE 5 ML: 10; 100 LIQUID ORAL at 18:24

## 2020-11-21 RX ADMIN — GUAIFENESIN AND CODEINE PHOSPHATE 5 ML: 10; 100 LIQUID ORAL at 23:31

## 2020-11-21 RX ADMIN — ALBUTEROL SULFATE 2 PUFF: 90 AEROSOL, METERED RESPIRATORY (INHALATION) at 14:03

## 2020-11-21 RX ADMIN — DEXAMETHASONE SODIUM PHOSPHATE 6 MG: 4 INJECTION, SOLUTION INTRAMUSCULAR; INTRAVENOUS at 07:55

## 2020-11-21 RX ADMIN — ALBUTEROL SULFATE 2 PUFF: 90 AEROSOL, METERED RESPIRATORY (INHALATION) at 09:58

## 2020-11-21 RX ADMIN — GUAIFENESIN AND CODEINE PHOSPHATE 5 ML: 10; 100 LIQUID ORAL at 09:55

## 2020-11-21 RX ADMIN — GUAIFENESIN AND CODEINE PHOSPHATE 5 ML: 10; 100 LIQUID ORAL at 14:03

## 2020-11-21 ASSESSMENT — ACTIVITIES OF DAILY LIVING (ADL)
ADLS_ACUITY_SCORE: 15
ADLS_ACUITY_SCORE: 17
ADLS_ACUITY_SCORE: 15
ADLS_ACUITY_SCORE: 15
ADLS_ACUITY_SCORE: 17
ADLS_ACUITY_SCORE: 17

## 2020-11-21 NOTE — PLAN OF CARE
Vitals: Temp: 97.6  F (36.4  C) Temp src: Oral BP: 138/82 Pulse: 122   Resp: 18 O2 92% O2 Device: Nasal cannula 0.5L  Neuro:  Respiratory: LS clr, cough present. PRN robitussin x2, albuterol x, effective. O2 weaned to 0.5L, tolerating well.   Cardiac/tele: WDL  GI/: WDL  Skin: WDL  LDAs: PIV to left SL  Labs: Covid positive. Special precautions maintained.   Diet: Regular  Activity: Ind in room   Plan: Plan to discharge once on ra. Will continue POC.

## 2020-11-21 NOTE — PLAN OF CARE
Presentation/Diagnosis:Covid positive   Labs/Protocols:K4.0, CRP 10.8  Vitals:VSS, no complaints of pain this shift   Respiratory:0.5L NC sating 96%. Pt was on room air sating 96%,. Put pt on 0.5L for when pt has coughing episode. Pt still gets TAYLOR, takes him about 5mins or more to recover, oxygen sats dip to low 80s.   Neuro:A&O  GI/:WNL,    Skin:Intact   LDAs:PIV SL  Diet:Regular   Activity:IND  Teaching:pt educated on plan of care  Plan:Cont to wean off oxygen. PRN robutussin and Inhaler given Q4 hours. . possible discharge tomorrow if pt is tolerating room air and is stable.

## 2020-11-21 NOTE — PROGRESS NOTES
[facility]    Hospitalist Progress Note    Date of Service (when I saw the patient): 11/21/2020  Provider: Rodney Coreas MD     Assessment & Plan   Dakotah Zheng is a 50 year old male who has no past medical history of chronic disease..  He was admitted on 11/15/2020 with fever, diarrhea, progressive shortness of breath and nonproductive cough. He has been diagnosed with COVID-19 infection on November 5. He was quarantining at home until presentation to the emergency department.    1.  COVID-19 infection, progressive symptomatology with fever, shortness of breath, cough and diarrhea.  -Regular diet as tolerated. COVID-19 isolation.  -Completed 5 days course of remdisivir. Will continue Dexamethasone 6 mg 1 tablet daily.  -Tylenol for pain or fever.  -No evidence of evidence of bacterial pneumonia, discontinued ceftriaxone and Zithromax.  -Robitussin Codeine for cough   -Wean off oxygen as able    2.  COVID-19 pneumonia with bilateral infiltrates as documented in the chest x-ray.  -Oxygen per nasal cannula to keep oxygen saturation 94% or higher. Albuterol MDI 2 puff every 4 hours as needed.    3.  Acute hypoxemic respiratory failure secondary to 1 and 2.    DVT Prophylaxis: Enoxaparin (Lovenox) subcutaneous    Code Status: Full Code    Disposition: Expected discharge once breathing room air, likely tomorrow.    Interval History   He is stable and improving, denies nausea or vomiting. Stated that cough is improving    -Data reviewed today: I reviewed all new labs and imaging results over the last 24 hours.     Physical Exam   Temp: 98.5  F (36.9  C) Temp src: Oral BP: 113/75 Pulse: 107   Resp: 18 SpO2: 94 % O2 Device: None (Room air) Oxygen Delivery: 1/2 LPM  Vitals:    11/16/20 0320   Weight: 72.6 kg (160 lb)     Vital Signs with Ranges  Temp:  [96  F (35.6  C)-98.5  F (36.9  C)] 98.5  F (36.9  C)  Pulse:  [] 107  Resp:  [18-24] 18  BP: (113-138)/(65-82) 113/75  SpO2:  [85 %-95 %] 94  %  I/O last 3 completed shifts:  In: 3 [I.V.:3]  Out: -     GEN:  Alert, oriented x 3, appears comfortable, NAD.  HEENT:  Normocephalic/atraumatic, no scleral icterus, no nasal discharge, mouth moist.  CV:  Regular rate and rhythm, no murmur or JVD.  S1 + S2 noted, no S3 or S4.  LUNGS:  Clear to auscultation bilaterally without rales/rhonchi/wheezing/retractions.  Symmetric chest rise on inhalation noted.  ABD:  Active bowel sounds, soft, non-tender/non-distended.  No rebound/guarding/rigidity.  EXT:  No edema or cyanosis.  No joint synovitis noted.  SKIN:  Dry to touch, no exanthems noted in the visualized areas.       Medications     - MEDICATION INSTRUCTIONS -         dexamethasone  6 mg Intravenous Daily     enoxaparin ANTICOAGULANT  40 mg Subcutaneous Q24H     sodium chloride (PF)  3 mL Intracatheter Q8H       Data   Recent Labs   Lab 11/21/20  0621 11/19/20  0656 11/18/20  0804 11/17/20  0640 11/16/20  0716 11/15/20  2113   WBC  --  9.5 8.2 8.9 4.5  --    HGB  --  16.7 15.6 16.6 15.8  --    MCV  --  90 91 90 91  --     385 406 354 371  --    INR  --  1.12 1.08 1.11 1.13 1.09   NA  --  140 141 141 138  --    POTASSIUM  --  4.0 3.9 3.9 4.0 3.4   CHLORIDE  --  106 110* 112* 107  --    CO2  --  29 25 24 24  --    BUN  --  16 15 14 15  --    CR  --  0.79 0.78 0.76 0.77  --    ANIONGAP  --  5 6 5 7  --    KYARA  --  8.2* 7.9* 7.9* 8.0*  --    GLC  --  115* 91 98 128*  --    ALBUMIN  --  2.6* 2.4*  --   --  2.0*   PROTTOTAL  --  6.9 6.9  --   --  6.4*   BILITOTAL  --  0.7 0.8  --   --  0.9   ALKPHOS  --  202* 190*  --   --  193*   ALT  --  156* 113*  --   --  79*   AST  --  79* 88*  --   --  96*   TROPI  --   --  <0.015  --  <0.015 <0.015       No results found for this or any previous visit (from the past 24 hour(s)).          Disclaimer: This note consists of symbols derived from keyboarding, dictation and/or voice recognition software. As a result, there may be errors in the script that have gone undetected.  Please consider this when interpreting information found in this chart.

## 2020-11-21 NOTE — PROVIDER NOTIFICATION
Dr. Coreas notified pt in [facility] wants to stay another night.   He is 94% on RA. when he gets up and gets the coughing attacks he does go down to 80s and it takes him about 5 mins or more to recover.   Thank you     Dr. Coreas called back and said pt can stay another night.

## 2020-11-21 NOTE — PLAN OF CARE
VSS. SPO2 96% 1/2 L NC. LS: clear.nonproductive cough robitussin PRN @1600. Afebrile, Denies pain. A&O X4. Independent, Regular diet. PIV R.AC saline locked. Possible discharge tomorrow if breathing room AIR.

## 2020-11-22 VITALS
WEIGHT: 160 LBS | TEMPERATURE: 97.3 F | HEART RATE: 73 BPM | OXYGEN SATURATION: 94 % | BODY MASS INDEX: 26.66 KG/M2 | HEIGHT: 65 IN | SYSTOLIC BLOOD PRESSURE: 110 MMHG | DIASTOLIC BLOOD PRESSURE: 66 MMHG | RESPIRATION RATE: 16 BRPM

## 2020-11-22 PROCEDURE — 250N000013 HC RX MED GY IP 250 OP 250 PS 637: Performed by: HOSPITALIST

## 2020-11-22 PROCEDURE — 250N000011 HC RX IP 250 OP 636: Performed by: PHYSICIAN ASSISTANT

## 2020-11-22 RX ADMIN — GUAIFENESIN AND CODEINE PHOSPHATE 5 ML: 10; 100 LIQUID ORAL at 03:44

## 2020-11-22 RX ADMIN — DEXAMETHASONE SODIUM PHOSPHATE 6 MG: 4 INJECTION, SOLUTION INTRAMUSCULAR; INTRAVENOUS at 07:51

## 2020-11-22 RX ADMIN — ALBUTEROL SULFATE 2 PUFF: 90 AEROSOL, METERED RESPIRATORY (INHALATION) at 03:45

## 2020-11-22 RX ADMIN — GUAIFENESIN AND CODEINE PHOSPHATE 5 ML: 10; 100 LIQUID ORAL at 07:51

## 2020-11-22 ASSESSMENT — ACTIVITIES OF DAILY LIVING (ADL)
ADLS_ACUITY_SCORE: 15

## 2020-11-22 NOTE — PLAN OF CARE
Vitals: Temp: 97.3  F (36.3  C) Temp src: Oral BP: 112/72 Pulse: 110   Resp: 20 SpO2: 92 % O2 Device: None (Room air)   Neuro: Ao4   Respiratory: LS clr, O2 stable on room air. Cough present but managed well with PRN robitussin and inhaler.   Cardiac/tele: WDL  GI/: WDL  Skin: WDL  LDAs: PIV to right SL   Labs: COVID positive. Airborne precautions maintained.  Diet: Regular   Activity: ind in room   Plan: Plan for early discharge today. Will continue POC.

## 2020-11-22 NOTE — PLAN OF CARE
AVS reviewed with patient. Patient is in stable condition, VSS, no co pain/cp/sob. All discharge education reviewed with pt in regards to: diet, activity, safety, s/s to report, medications and rx, follow up appointments/care.  All questions answered. Pt denies any further questions or concerns. PIV removed. No complications. Telemetry monitor removed. All belongings returned. Pt escorted to front door by [facility] staff.

## 2020-11-23 ENCOUNTER — HOSPITAL ENCOUNTER (EMERGENCY)
Facility: CLINIC | Age: 50
Discharge: HOME | End: 2020-11-23
Attending: EMERGENCY MEDICINE | Admitting: EMERGENCY MEDICINE
Payer: OTHER GOVERNMENT

## 2020-11-23 VITALS
TEMPERATURE: 97.9 F | DIASTOLIC BLOOD PRESSURE: 88 MMHG | OXYGEN SATURATION: 94 % | SYSTOLIC BLOOD PRESSURE: 125 MMHG | BODY MASS INDEX: 23.63 KG/M2 | HEART RATE: 97 BPM | WEIGHT: 141.98 LBS | RESPIRATION RATE: 20 BRPM

## 2020-11-23 DIAGNOSIS — J12.82 PNEUMONIA DUE TO COVID-19 VIRUS: ICD-10-CM

## 2020-11-23 DIAGNOSIS — U07.1 PNEUMONIA DUE TO COVID-19 VIRUS: ICD-10-CM

## 2020-11-23 DIAGNOSIS — J96.01 ACUTE RESPIRATORY FAILURE WITH HYPOXIA (H): ICD-10-CM

## 2020-11-23 LAB
ALBUMIN SERPL-MCNC: 2.9 G/DL (ref 3.4–5)
ALP SERPL-CCNC: 167 U/L (ref 40–150)
ALT SERPL W P-5'-P-CCNC: 93 U/L (ref 0–70)
ANION GAP SERPL CALCULATED.3IONS-SCNC: 6 MMOL/L (ref 3–14)
AST SERPL W P-5'-P-CCNC: 30 U/L (ref 0–45)
BASOPHILS # BLD AUTO: 0 10E9/L (ref 0–0.2)
BASOPHILS NFR BLD AUTO: 0.3 %
BILIRUB SERPL-MCNC: 0.8 MG/DL (ref 0.2–1.3)
BUN SERPL-MCNC: 25 MG/DL (ref 7–30)
CALCIUM SERPL-MCNC: 8.8 MG/DL (ref 8.5–10.1)
CHLORIDE SERPL-SCNC: 100 MMOL/L (ref 94–109)
CO2 SERPL-SCNC: 27 MMOL/L (ref 20–32)
CREAT SERPL-MCNC: 0.88 MG/DL (ref 0.66–1.25)
DIFFERENTIAL METHOD BLD: ABNORMAL
EOSINOPHIL # BLD AUTO: 0 10E9/L (ref 0–0.7)
EOSINOPHIL NFR BLD AUTO: 0.3 %
ERYTHROCYTE [DISTWIDTH] IN BLOOD BY AUTOMATED COUNT: 14 % (ref 10–15)
GFR SERPL CREATININE-BSD FRML MDRD: >90 ML/MIN/{1.73_M2}
GLUCOSE SERPL-MCNC: 311 MG/DL (ref 70–99)
HCT VFR BLD AUTO: 47.5 % (ref 40–53)
HGB BLD-MCNC: 16.5 G/DL (ref 13.3–17.7)
IMM GRANULOCYTES # BLD: 0.2 10E9/L (ref 0–0.4)
IMM GRANULOCYTES NFR BLD: 1.5 %
INTERPRETATION ECG - MUSE: NORMAL
LYMPHOCYTES # BLD AUTO: 3 10E9/L (ref 0.8–5.3)
LYMPHOCYTES NFR BLD AUTO: 26 %
MCH RBC QN AUTO: 32 PG (ref 26.5–33)
MCHC RBC AUTO-ENTMCNC: 34.7 G/DL (ref 31.5–36.5)
MCV RBC AUTO: 92 FL (ref 78–100)
MONOCYTES # BLD AUTO: 0.8 10E9/L (ref 0–1.3)
MONOCYTES NFR BLD AUTO: 6.8 %
NEUTROPHILS # BLD AUTO: 7.6 10E9/L (ref 1.6–8.3)
NEUTROPHILS NFR BLD AUTO: 65.1 %
NRBC # BLD AUTO: 0 10*3/UL
NRBC BLD AUTO-RTO: 0 /100
PLATELET # BLD AUTO: 367 10E9/L (ref 150–450)
POTASSIUM SERPL-SCNC: 4.2 MMOL/L (ref 3.4–5.3)
PROT SERPL-MCNC: 7.3 G/DL (ref 6.8–8.8)
RBC # BLD AUTO: 5.16 10E12/L (ref 4.4–5.9)
SODIUM SERPL-SCNC: 133 MMOL/L (ref 133–144)
TROPONIN I SERPL-MCNC: <0.015 UG/L (ref 0–0.04)
WBC # BLD AUTO: 11.7 10E9/L (ref 4–11)

## 2020-11-23 PROCEDURE — U0003 INFECTIOUS AGENT DETECTION BY NUCLEIC ACID (DNA OR RNA); SEVERE ACUTE RESPIRATORY SYNDROME CORONAVIRUS 2 (SARS-COV-2) (CORONAVIRUS DISEASE [COVID-19]), AMPLIFIED PROBE TECHNIQUE, MAKING USE OF HIGH THROUGHPUT TECHNOLOGIES AS DESCRIBED BY CMS-2020-01-R: HCPCS | Performed by: EMERGENCY MEDICINE

## 2020-11-23 PROCEDURE — C9803 HOPD COVID-19 SPEC COLLECT: HCPCS

## 2020-11-23 PROCEDURE — 93005 ELECTROCARDIOGRAM TRACING: CPT

## 2020-11-23 PROCEDURE — 84484 ASSAY OF TROPONIN QUANT: CPT | Performed by: EMERGENCY MEDICINE

## 2020-11-23 PROCEDURE — 80053 COMPREHEN METABOLIC PANEL: CPT | Performed by: EMERGENCY MEDICINE

## 2020-11-23 PROCEDURE — 85025 COMPLETE CBC W/AUTO DIFF WBC: CPT | Performed by: EMERGENCY MEDICINE

## 2020-11-23 PROCEDURE — 99284 EMERGENCY DEPT VISIT MOD MDM: CPT

## 2020-11-23 ASSESSMENT — ACTIVITIES OF DAILY LIVING (ADL): DEPENDENT_IADLS:: INDEPENDENT

## 2020-11-23 ASSESSMENT — ENCOUNTER SYMPTOMS
COUGH: 1
SHORTNESS OF BREATH: 1

## 2020-11-23 NOTE — CONSULTS
"Care Management Initial Consult    General Information  Assessment completed with: PatientDakotah, via IPAD d/t COVID precautions     Primary Care Provider verified and updated as needed:   no  Readmission within the last 30 days: lack of support;previous discharge plan unsuccessful   Return Category: Medically unsuccessful treatment plan  Reason for Consult: discharge planning;insurance concerns.  Pt was inpatient in this hospital with COVID from 11/15/20 to 11/22/20.  Advance Care Planning:     none occurred today in the ED.       Communication Assessment  Patient's communication style: spoken language (English or Bilingual)             Cognitive  Cognitive/Neuro/Behavioral: WDL                      Living Environment:   People in home: child(evan), dependent;spouse  Cristina, Pt reports that he also has a disabled child  Current living Arrangements: mobile home      Able to return to prior arrangements: yes       Family/Social Support:  Care provided by: self;spouse/significant other  Provides care for: child(evan)  Marital Status:   Wife;Sibling(s)  Maria, Severa Jasso     , sister works in registration in this hospital  Description of Support System: Supportive;Involved         Current Resources:   Skilled Home Care Services:  None  Community Resources: None  Equipment currently used at home: none  Supplies currently used at home: None    Employment/Financial:  Employment Status: employed full-time   , Pt works for a temp agency as a      Financial Concerns: insurance inadequate .  Pt was in our system as \"self pay\".  When I talked with Pt, he reported that he has insurance, but that he was told that it was only for \"doctor visits.\"  I told him that we needed to get the information in our system, and determine if insurance would pay for the home oxygen, and that they may pay for some of his hospital stay and ED visit.          Lifestyle & Psychosocial Needs:        Socioeconomic History     Marital " status:      Spouse name: Not on file     Number of children: Not on file     Years of education: Not on file     Highest education level: Not on file     Tobacco Use     Smoking status: Never Smoker   Substance and Sexual Activity     Alcohol use: No     Alcohol/week: 0.0 standard drinks     Drug use: No       Functional Status:  Prior to admission patient needed assistance:   Dependent ADLs:: Independent  Dependent IADLs:: Independent  Assesssment of Functional Status: Not at baseline with ADL Functioning, pt is SOB with activity, etc.    Mental Health Status:  Mental Health Status: No Current Concerns       Chemical Dependency Status:  Chemical Dependency Status: No Current Concerns             Values/Beliefs:  Spiritual, Cultural Beliefs, Jain Practices, Values that affect care:       unknown          Additional Information:    I am not sure what resources Pt was given and offered at discharge yesterday.      Care Management Discharge Note    Discharge Date: 11/23/20       Discharge Disposition: Home    Discharge Services: None    Discharge DME: Oxygen    Discharge Transportation: family or friend will provide    Private pay costs discussed: We had a lengthy discussion about health insurance    PAS Confirmation Code:    NA  Patient/family educated on Medicare website which has current facility and service quality ratings: no    Education Provided on the Discharge Plan:  yes  Persons Notified of Discharge Plans: none  Patient/Family in Agreement with the Plan: yes    Handoff Referral Completed: No    Additional Information:    Pt's existing health insurance was put into Epic by registration at the hospital , where Pt's sister ( emergency contact) was working. ( Pt was OK with me talking to his sister).  I phoned [facility], and asked them to run his home oxygen request through his insurance.  Pt also wanted additional financial resources, so I gave him the following brochures:  1)  Contact information for Diann, the SW hired by the [place] [employer] affiliated with [facility].  ( Pt is a [place] citizen)  2) [facility] free/low cost clinics.  3) [facility]  4) [place] Sure and Kalen Blanc[place] application, and education on having 2 insurance policies.    Pt expressed understanding of the resources given to him.          Kamryn Smyth  RN Care Coordinator,  BSN, PHN, CCM, GLADYS  Inpatient Care Coordination - Emergency Department  [facility]   [phone]

## 2020-11-23 NOTE — ED AVS SNAPSHOT
[facility]  [address]  [facility] [address]  Phone: [phone]  Fax: [phone]                                    Dakotah Zheng   MRN: [id number]    Department: [facility]   Date of Visit: 11/23/2020           After Visit Summary Signature Page    I have received my discharge instructions, and my questions have been answered. I have discussed any challenges I see with this plan with the nurse or doctor.    ..........................................................................................................................................  Patient/Patient Representative Signature      ..........................................................................................................................................  Patient Representative Print Name and Relationship to Patient    ..................................................               ................................................  Date                                   Time    ..........................................................................................................................................  Reviewed by Signature/Title    ...................................................              ..............................................  Date                                               Time          22EPIC Rev 08/18

## 2020-11-23 NOTE — DISCHARGE INSTRUCTIONS
The [place] [employer] in [place], [place], has received funds and resources to help [place]als without citizenship status who are COVID 19 positive.  Please contact Darien Cevallos, who works out of the [facility] Free and low cost clinics, to help you recover from COVID 19, at [phone] and [email or web address].    El [employer] [place] en [place], [place], ha recibido fondos y recursos para ayudar a los ciudadanos mexicanos sin estatus de ciudadanin que son COVID 19 positivos. Comuniquese con Darien Cevallos, quien trabaja en las clinicas gratuitas y de bajo costo de [facility], para ayudarlo a recuperarse de COVID 19, al [phone] y [email or web address].      Discharge Instructions  COVID-19    COVID-19 is the disease caused by a new coronavirus. The virus spreads from person-to-person primarily by droplets when an infected person coughs or sneezes and the droplet either lands on another person or that other person touches a surface with the droplet on it. There are tests available to diagnose COVID-19. There is no specific treatment or medicine for the disease.    You may have been diagnosed with COVID, may be being tested for COVID and have a pending test result, or may have been exposed to COVID.    Symptoms of COVID-19  Many people have no symptoms or mild symptoms.  Symptoms may usually appear 4 to 5 days (up to 14 days) after contact with a person with COVID-19. Some people will get severe symptoms and pneumonia. Usual symptoms are:     ? Fever  ? Cough  ? Trouble breathing    Less common symptoms are: Headache, body aches, sore throat, sneezing, diarrhea,loss of taste or smell.    Isolation and Quarantine    You were seen because you have symptoms, had an exposure, or had some other concern about possible COVID. The best way to stop the spread of the virus is to avoid contact with others.  Isolation refers to sick people staying away from people who are not sick. A person in quarantine is limiting  activity because they were exposed and are waiting to see if they might become sick.    If you test positive for COVID, you should stay home (isolation) for at least 10 days after your symptoms began, and for 24 hours with no fever and improvement of symptoms--whichever is longer. (Your fever should be gone for 24 hours without using fever-reducing medicine). If you have no symptoms, you should stay home (isolation) for 10 days from the day of the test.    For example, if you have a fever and cough for 6 days, you need to stay home 4 more days with no fever for a total of 10 days. Or, if you have a fever and cough for 10 days, you need to stay home one more day with no fever for a total of 11 days.    If you have a high-risk exposure to COVID (you spent 15 minutes or more within six feet of somebody who has COVID), you should stay home (quarantine) for 14 days. Even if you test negative for COVID, the CDC recommends a 14-day quarantine from the time of your last exposure to that individual.    If you have symptoms but a negative test, you should stay at home until you are symptom-free and without fever for 24 hours, using the same judgment you would for when it is safe to return to work/school from strep throat, influenza, or the common cold. If you worsen, you should consider being re-evaluated.    If you are being tested for COVID and your test is pending, you should stay home until you know your test result.    How should I protect myself and others?    Do not go to work or school. Have a friend or relative do your shopping. Do not use public transportation (bus, train) or ridesharing (Lyft, Uber).    Separate yourself from other people in your home.?As much as possible, you should stay in one room and away from other people in your home. Also, use a separate bathroom, if possible. Avoid handling pets or other animals while sick.     Wear a facemask if you need to be around other people and cover your mouth and  nose with a tissue when you cough or sneeze.     Avoid sharing personal household items. You should not share dishes, drinking glasses, forks/knives/spoons, towels, or bedding with other people in your home. After using these items, they should be washed with soap and water. Clean parts of your home that are touched often (doorknobs, faucets, countertops, etc.) daily.     Wash your hands often with soap and water for at least 20 seconds or use an alcohol-based hand  containing at least 60% alcohol.     Avoid touching your face.    Treat your symptoms. You can take Acetaminophen (Tylenol) to treat body aches and fever as needed for comfort. Ibuprofen (Advil or Motrin) can be used as well if you still have symptoms after taking Tylenol. Drink fluids. Rest.    Watch for worsening symptoms such as shortness of breath/difficulty breathing or very severe weakness.    Employers/workplaces are being asked by the Centers for Disease Control (CDC) to not request notes/documentation for you to return to work or prove that you were ill. You may choose to show your employer this paperwork. Also, repeat testing should not be required to return to work.    Return to the Emergency Department if:    If you are developing worsening breathing, shortness of breath, or feel worse you should seek medical attention.  If you are uncertain, contact your health care provider/clinic. If you need emergency medical attention, call 911 and tell them you have been ill.    Oxygen Provider:  Arranged through [employer], contact number [phone].  If you have any questions or concerns please call the oxygen company directly.

## 2020-11-23 NOTE — ED TRIAGE NOTES
Patient positive for COVID 11/5, still with symptoms after being hospitalized for a week.  Seen at [facility] and referred to ED for lo SpO2 89%.

## 2020-11-23 NOTE — ED PROVIDER NOTES
History     Chief Complaint:  Hypoxia and Shortness of Breath    The patient examined in room 16.  HPI   Dakotah Zheng is a 50 year old male who presents with shortness of breath. Per chart review, the patient was recently admitted from 11/15 to 11/22 for pneumonia due to COVID-19. Today he went back to urgent care to get a note to get back to work, but had low oxygen so was sent here. He has a cough and has shortness of breath. He continues to have subjective fevers and chills. Patient states that he wants to go home and feels quite well at rest but does get short of breath with activity and just thought that was because he was recovering from COVID.    Allergies:  No Known Drug Allergies      Medications:    Albuterol inhaler  Robitussinac    Past Medical History:    Pneumonia     Past Surgical History:    Cholecystectomy  Tonsillectomy  Circumcision  ERCP    Family History:    Diabetes     Social History:  Smoking status: Never  Alcohol use: No  Drug use: No  Patient presents alone.  PCP: Ilda Art    Marital Status:        Review of Systems   Respiratory: Positive for cough and shortness of breath.    All other systems reviewed and are negative.      Physical Exam     Patient Vitals for the past 24 hrs:   BP Temp Temp src Pulse Resp SpO2 Weight   11/23/20 1245 -- -- -- -- -- 94 % --   11/23/20 1230 -- -- -- -- -- 94 % --   11/23/20 1215 -- -- -- -- -- 95 % --   11/23/20 1200 -- -- -- -- -- 94 % --   11/23/20 1044 125/88 97.9  F (36.6  C) Oral 97 20 (!) 89 % 64.4 kg (141 lb 15.6 oz)      Physical Exam  Constitutional: Alert  HENT:    Nose: Nose normal.    Mouth/Throat: Oropharynx is clear, mucous membranes are moist  Eyes: EOM are normal. Pupils are equal, round, and reactive to light.   CV: Regular rate and rhythm, no murmurs, rubs or gallops.  Resp: Clear lungs to auscultation, all lung fields. Normal respiratory effort.   GI: Soft, non-distended. There is no tenderness. No rebound or guarding.    MSK: Normal range of motion. No deformity.   Neurological:   A/Ox3  5/5 strength is symmetric to the upper and lower extremities;   Sensation intact to light touch throughout the upper and lower extremities;   Skin: Skin is warm and dry.     Emergency Department Course   ECG:  @ 1103  Indication: Shortness of breath   Vent. Rate 82 bpm. WA interval 166 ms. QRS duration 80 ms. QT/QTc 354/413 ms. P-R-T axis 39 31 25.   Normal sinus rhythm. Normal ECG.  No significant change when compared to previous ECG from 17   Read @ 1108 by Dr. Palomo.     Laboratory:  CBC: WBC 11.7 (H), HGB 16.5,      CMP: Glucose 311 (H), Albumin: 2.9 (L), Alkaline phosphatase: 167 (H), ALT: 93 (H), o/w WNL (Creatinine: 0.88)     1104 Troponin I: <0.015     Symptomatic COVID-19 Virus by PCR: In process     Emergency Department Course:  1054 Nursing notes and vitals reviewed. I performed an exam of the patient as documented above.     EKG was done, interpretation as above.    Blood drawn. This was sent to the lab for further testing, results above.    Findings and plan explained to the Patient. Patient discharged home with instructions regarding supportive care, medications, and reasons to return. The importance of close follow-up was reviewed.     I personally reviewed the laboratory results with the Patient and answered all related questions prior to discharge.      Impression & Plan    Covid-19  Dakotah Zheng was evaluated during a global COVID-19 pandemic, which necessitated consideration that the patient might be at risk for infection with the SARS-CoV-2 virus that causes COVID-19.   Applicable protocols for evaluation were followed during the patient's care.   COVID-19 was considered as part of the patient's evaluation. The plan for testing is:  a test was obtained during this visit.         Medical Decision Makin year old male with recent COVID hospitalization and now presents with hypoxia at urgent care. Patient  otherwise feels well. Patient does not want to be readmitted. CBC unremarkable. Electrolytes WNL. Troponin negative. EKG non-ischemic. Patient was set up with home oxygen and [facility] dropped off oxygen at the  ER and he was instructed on its use prior to discharge. Patient expressed understanding and was in agreement with the plan and discharge instructions which included reasons to return and follow-up.    Diagnosis:    ICD-10-CM    1. Acute respiratory failure with hypoxia (H)  J96.01 CBC with platelets differential     Comprehensive metabolic panel     Troponin I     Symptomatic COVID-19 Virus (Coronavirus) by PCR     Oxygen Order   2. Pneumonia due to COVID-19 virus  U07.1 Oxygen Order    J12.89        Disposition:  Discharged to home        Scribe Disclosure:  Alejandra FAGAN, am serving as a scribe at 10:54 AM on 11/23/2020 to document services personally performed by Davey Palomo DO based on my observations and the provider's statements to me.          Davey Palomo DO  11/23/20 4543

## 2020-11-24 ENCOUNTER — PATIENT OUTREACH (OUTPATIENT)
Dept: CARE COORDINATION | Facility: CLINIC | Age: 50
End: 2020-11-24

## 2020-11-24 DIAGNOSIS — U07.1 2019 NOVEL CORONAVIRUS DISEASE (COVID-19): Primary | ICD-10-CM

## 2020-11-24 LAB
SARS-COV-2 RNA SPEC QL NAA+PROBE: ABNORMAL
SPECIMEN SOURCE: ABNORMAL

## 2020-11-25 NOTE — PROGRESS NOTES
Clinic Care Coordination Contact  [facility]/Voicemail    Clinical Data: Care Coordinator Outreach  Outreach attempted x 1.  Left message on patient's voicemail with call back information and requested return call.  Plan: Care Coordinator will try to reach patient again in 1-2 business days.      
Clinic Care Coordination Contact  [facility]    Clinical Data: Care Coordinator Outreach  Outreach attempted x 2.  VM Full.  Plan: Care Coordinator will do no further outreaches at this time.      
No

## 2020-11-29 NOTE — DISCHARGE SUMMARY
[facility]    Discharge Summary  Hospitalist    Date of Admission:  11/15/2020  Date of Discharge:  11/22/2020 11:02 AM  Discharging Provider: Rodney Coreas MD  Date of Service (when I saw the patient): 11/29/20    Discharge Diagnoses      1.  COVID-19 infection    2.  COVID-19 pneumonia with bilateral infiltrates as documented in the chest x-ray.    3.  Acute hypoxemic respiratory failure secondary to 1 and 2.       History of Present Illness   Dakotah Zheng is an 50 year old male who presented with shortness of breath and cough secondary to COVID-19 infection.    Hospital Course      Dakotah Zheng is a 50 year old male who has no past medical history of chronic disease..  He was admitted on 11/15/2020 with fever, diarrhea, progressive shortness of breath and nonproductive cough. He has been diagnosed with COVID-19 infection on November 5. He was quarantining at home until presentation to the emergency department.     1.  COVID-19 infection, progressive symptomatology with fever, shortness of breath, cough and diarrhea.  -Regular diet as tolerated. COVID-19 isolation.  -Completed 5 days course of remdisivir. Will continue Dexamethasone 6 mg 1 tablet daily.  -Tylenol for pain or fever.  -No evidence of evidence of bacterial pneumonia, discontinued ceftriaxone and Zithromax.  -Robitussin Codeine for cough   -Wean off oxygen as able     2.  COVID-19 pneumonia with bilateral infiltrates as documented in the chest x-ray.  -Oxygen per nasal cannula to keep oxygen saturation 94% or higher. Albuterol MDI 2 puff every 4 hours as needed.     3.  Acute hypoxemic respiratory failure secondary to 1 and 2.     Patient remained stable. He was discharged in a stable condition.     Significant Results and Procedures   Results for orders placed or performed during the hospital encounter of 11/15/20   XR Chest Port 1 View    Narrative    EXAM: XR CHEST PORT 1 VW  LOCATION: [facility]  Services  DATE/TIME: 11/15/2020 5:08 PM    INDICATION: Shortness of breath  COMPARISON: 06/12/2017      Impression    IMPRESSION: Extensive bilateral pulmonary alveolar infiltrates predominantly in the periphery. Findings compatible bilateral pneumonia. Normal heart size and pulmonary vascularity. Surgical clips right upper quadrant. Osseous structures unremarkable.         Pending Results   None  Code Status   Full Code       Primary Care Physician   Ilda Art        Discharge Disposition   Discharged to home  Condition at discharge: Stable    Consultations This Hospital Stay   ADVANCE DIRECTIVE IP CONSULT    Time Spent on this Encounter   IRodney MD, MD, personally saw the patient today and spent greater than 30 minutes discharging this patient.    Discharge Orders      Reason for your hospital stay    COVID pneumonia     Follow-up and recommended labs and tests     Follow up with primary care provider, Ilda Art, within 7 days     Activity    Your activity upon discharge: activity as tolerated     Diet    Follow this diet upon discharge: Orders Placed This Encounter      Combination Diet Regular Diet Adult     Discharge Medications   Discharge Medication List as of 11/22/2020 10:32 AM      START taking these medications    Details   albuterol (PROAIR HFA/PROVENTIL HFA/VENTOLIN HFA) 108 (90 Base) MCG/ACT inhaler Inhale 2 puffs into the lungs every 4 hours as needed for shortness of breath / dyspnea, wheezing or other (Bronchospasm), Disp-1 Inhaler, R-0, E-PrescribePharmacy may dispense brand covered by insurance (Proair, or proventil or ventolin or generic albu terol inhaler)      guaiFENesin-codeine (ROBITUSSIN AC) 100-10 MG/5ML solution Take 5 mLs by mouth every 4 hours as needed for cough, Disp-118 mL, R-0, Local Print             Allergies   No Known Allergies  Data   Most Recent 3 CBC's:  Recent Labs   Lab Test 11/23/20  1104 11/21/20  0621 11/19/20  0656 11/18/20  0804   WBC 11.7*   --  9.5 8.2   HGB 16.5  --  16.7 15.6   MCV 92  --  90 91    405 385 406      Most Recent 3 BMP's:  Recent Labs   Lab Test 11/23/20  1104 11/19/20  0656 11/18/20  0804    140 141   POTASSIUM 4.2 4.0 3.9   CHLORIDE 100 106 110*   CO2 27 29 25   BUN 25 16 15   CR 0.88 0.79 0.78   ANIONGAP 6 5 6   KYARA 8.8 8.2* 7.9*   * 115* 91     Most Recent 2 LFT's:  Recent Labs   Lab Test 11/23/20  1104 11/19/20  0656   AST 30 79*   ALT 93* 156*   ALKPHOS 167* 202*   BILITOTAL 0.8 0.7     Most Recent INR's and Anticoagulation Dosing History:  Anticoagulation Dose History     Recent Dosing and Labs Latest Ref Rng & Units 6/23/2016 11/15/2020 11/16/2020 11/17/2020 11/18/2020 11/19/2020    INR 0.86 - 1.14 0.96 1.09 1.13 1.11 1.08 1.12        Most Recent 3 Troponin's:  Recent Labs   Lab Test 11/23/20  1104 11/18/20  0804 11/16/20  0716 07/13/16  2152 07/13/16  2152   TROPI <0.015 <0.015 <0.015   < >  --    TROPONIN  --   --   --   --  0.00    < > = values in this interval not displayed.     Most Recent Cholesterol Panel:No lab results found.  Most Recent 6 Bacteria Isolates From Any Culture (See EPIC Reports for Culture Details):  Recent Labs   Lab Test 11/15/20  1857 11/15/20  1735   CULT No growth No growth     Most Recent TSH, T4 and A1c Labs:No lab results found.

## 2021-09-07 ENCOUNTER — IMMUNIZATION (OUTPATIENT)
Dept: FAMILY MEDICINE | Facility: CLINIC | Age: 51
End: 2021-09-07
Payer: COMMERCIAL

## 2021-09-07 PROCEDURE — 0011A PR COVID VAC MODERNA 100 MCG/0.5 ML IM: CPT

## 2021-09-07 PROCEDURE — 91301 PR COVID VAC MODERNA 100 MCG/0.5 ML IM: CPT

## 2021-10-05 ENCOUNTER — IMMUNIZATION (OUTPATIENT)
Dept: FAMILY MEDICINE | Facility: CLINIC | Age: 51
End: 2021-10-05
Attending: FAMILY MEDICINE
Payer: COMMERCIAL

## 2021-10-05 PROCEDURE — 91301 PR COVID VAC MODERNA 100 MCG/0.5 ML IM: CPT

## 2021-10-05 PROCEDURE — 0012A PR COVID VAC MODERNA 100 MCG/0.5 ML IM: CPT

## 2023-11-19 ENCOUNTER — HOSPITAL ENCOUNTER (EMERGENCY)
Facility: CLINIC | Age: 53
Discharge: HOME | End: 2023-11-19
Attending: EMERGENCY MEDICINE | Admitting: EMERGENCY MEDICINE
Payer: COMMERCIAL

## 2023-11-19 ENCOUNTER — APPOINTMENT (OUTPATIENT)
Dept: CT IMAGING | Facility: CLINIC | Age: 53
End: 2023-11-19
Attending: EMERGENCY MEDICINE
Payer: COMMERCIAL

## 2023-11-19 VITALS
SYSTOLIC BLOOD PRESSURE: 143 MMHG | WEIGHT: 158.51 LBS | RESPIRATION RATE: 18 BRPM | DIASTOLIC BLOOD PRESSURE: 90 MMHG | HEIGHT: 65 IN | HEART RATE: 89 BPM | OXYGEN SATURATION: 96 % | BODY MASS INDEX: 26.41 KG/M2 | TEMPERATURE: 98.5 F

## 2023-11-19 DIAGNOSIS — R10.9 LEFT FLANK PAIN: ICD-10-CM

## 2023-11-19 DIAGNOSIS — K57.30 DIVERTICULOSIS OF LARGE INTESTINE WITHOUT HEMORRHAGE: ICD-10-CM

## 2023-11-19 DIAGNOSIS — N26.1 ATROPHY OF LEFT KIDNEY: ICD-10-CM

## 2023-11-19 LAB
ALBUMIN UR-MCNC: 10 MG/DL
ANION GAP SERPL CALCULATED.3IONS-SCNC: 12 MMOL/L (ref 7–15)
APPEARANCE UR: CLEAR
BASOPHILS # BLD AUTO: 0 10E3/UL (ref 0–0.2)
BASOPHILS NFR BLD AUTO: 0 %
BILIRUB UR QL STRIP: NEGATIVE
BUN SERPL-MCNC: 18.1 MG/DL (ref 6–20)
CALCIUM SERPL-MCNC: 8.6 MG/DL (ref 8.6–10)
CHLORIDE SERPL-SCNC: 103 MMOL/L (ref 98–107)
COLOR UR AUTO: YELLOW
CREAT SERPL-MCNC: 1.13 MG/DL (ref 0.67–1.17)
DEPRECATED HCO3 PLAS-SCNC: 23 MMOL/L (ref 22–29)
EGFRCR SERPLBLD CKD-EPI 2021: 78 ML/MIN/1.73M2
EOSINOPHIL # BLD AUTO: 0.1 10E3/UL (ref 0–0.7)
EOSINOPHIL NFR BLD AUTO: 1 %
ERYTHROCYTE [DISTWIDTH] IN BLOOD BY AUTOMATED COUNT: 13.3 % (ref 10–15)
GLUCOSE SERPL-MCNC: 116 MG/DL (ref 70–99)
GLUCOSE UR STRIP-MCNC: NEGATIVE MG/DL
HCT VFR BLD AUTO: 50.5 % (ref 40–53)
HGB BLD-MCNC: 18.7 G/DL (ref 13.3–17.7)
HGB UR QL STRIP: NEGATIVE
IMM GRANULOCYTES # BLD: 0 10E3/UL
IMM GRANULOCYTES NFR BLD: 0 %
KETONES UR STRIP-MCNC: 20 MG/DL
LEUKOCYTE ESTERASE UR QL STRIP: NEGATIVE
LYMPHOCYTES # BLD AUTO: 2.7 10E3/UL (ref 0.8–5.3)
LYMPHOCYTES NFR BLD AUTO: 29 %
MCH RBC QN AUTO: 32.7 PG (ref 26.5–33)
MCHC RBC AUTO-ENTMCNC: 37 G/DL (ref 31.5–36.5)
MCV RBC AUTO: 88 FL (ref 78–100)
MONOCYTES # BLD AUTO: 0.6 10E3/UL (ref 0–1.3)
MONOCYTES NFR BLD AUTO: 6 %
MUCOUS THREADS #/AREA URNS LPF: PRESENT /LPF
NEUTROPHILS # BLD AUTO: 5.9 10E3/UL (ref 1.6–8.3)
NEUTROPHILS NFR BLD AUTO: 64 %
NITRATE UR QL: NEGATIVE
NRBC # BLD AUTO: 0 10E3/UL
NRBC BLD AUTO-RTO: 0 /100
PH UR STRIP: 5.5 [PH] (ref 5–7)
PLATELET # BLD AUTO: 239 10E3/UL (ref 150–450)
POTASSIUM SERPL-SCNC: 4.2 MMOL/L (ref 3.4–5.3)
RBC # BLD AUTO: 5.71 10E6/UL (ref 4.4–5.9)
RBC URINE: <1 /HPF
SODIUM SERPL-SCNC: 138 MMOL/L (ref 135–145)
SP GR UR STRIP: 1.02 (ref 1–1.03)
UROBILINOGEN UR STRIP-MCNC: NORMAL MG/DL
WBC # BLD AUTO: 9.3 10E3/UL (ref 4–11)
WBC URINE: 1 /HPF

## 2023-11-19 PROCEDURE — 80048 BASIC METABOLIC PNL TOTAL CA: CPT | Performed by: EMERGENCY MEDICINE

## 2023-11-19 PROCEDURE — 74176 CT ABD & PELVIS W/O CONTRAST: CPT

## 2023-11-19 PROCEDURE — 36415 COLL VENOUS BLD VENIPUNCTURE: CPT | Performed by: EMERGENCY MEDICINE

## 2023-11-19 PROCEDURE — 85025 COMPLETE CBC W/AUTO DIFF WBC: CPT | Performed by: EMERGENCY MEDICINE

## 2023-11-19 PROCEDURE — 99284 EMERGENCY DEPT VISIT MOD MDM: CPT | Mod: 25

## 2023-11-19 PROCEDURE — 81001 URINALYSIS AUTO W/SCOPE: CPT | Performed by: EMERGENCY MEDICINE

## 2023-11-19 RX ORDER — IBUPROFEN 600 MG/1
600 TABLET, FILM COATED ORAL EVERY 6 HOURS PRN
Qty: 30 TABLET | Refills: 0 | Status: SHIPPED | OUTPATIENT
Start: 2023-11-19 | End: 2023-12-19

## 2023-11-19 RX ORDER — LIDOCAINE 4 G/G
1 PATCH TOPICAL DAILY PRN
Qty: 15 PATCH | Refills: 0 | Status: SHIPPED | OUTPATIENT
Start: 2023-11-19

## 2023-11-19 ASSESSMENT — ACTIVITIES OF DAILY LIVING (ADL)
ADLS_ACUITY_SCORE: 35
ADLS_ACUITY_SCORE: 35

## 2023-11-19 NOTE — ED PROVIDER NOTES
"  History     Chief Complaint:  Left side pain     The history is provided by the patient.      Dakotah Zheng is a 53 year old male who presents to the ED for evaluation of left sided flank pain. He was at [facility] and they sent him to the ED. He explains that he's unsure if he pulled a muscle because was doing some heavy lifting at work and notes that the pain is sharp. The pain wax and wanes and comes about every hour. Yesterday he took tylenol and it did not improve.  Nothing else makes it better or worse. The pain stays in the same spot. Denies any nausea, vomiting, dysuria, hematuria, and chest pain. He does note that he had his gallbladder removed as well. He denies any fever. No direct trauma or fall. He denies any other symptoms.    Independent Historian:   None - Patient Only    Review of External Notes:  None    ROS:  See HPI    Allergies:  No Known Allergies     Physical Exam   Patient Vitals for the past 24 hrs:   BP Temp Temp src Pulse Resp SpO2 Height Weight   11/19/23 1207 (!) 143/90 98.5  F (36.9  C) Temporal 89 18 96 % 1.651 m (5' 5\") 71.9 kg (158 lb 8.2 oz)        Physical Exam  General: Well appearing, nontoxic. Resting comfortably  Head:  Scalp, face, and head appear normal  Eyes:  Pupils are equal, round    Conjunctivae non-injected and sclerae white  ENT:    The external nose is normal    Pinnae are normal  Neck:  Normal range of motion    There is no rigidity noted    Trachea is in the midline  CV:  Regular rate and rhythm     Normal S1/S2, no S3/S4    No murmur or rub. Radial pulses 2+ bilaterally.  Resp:  Lungs are clear and equal bilaterally  There is no tachypnea    No increased work of breathing    No rales, wheezing, or rhonchi  GI:  Abdomen is soft, no rigidity or guarding    No distension, or mass    No tenderness or rebound tenderness    Mild left lateral abdominal wall/flank tenderness to palpation. No overlying skin changes.  MS:  Normal muscular tone  Skin:  No rash " or acute skin lesions noted  Neuro:  Awake and alert  Speech is normal and fluent  Moves all extremities spontaneously  Psych: Normal affect. Appropriate interactions.     Emergency Department Course   ECG  ECG taken at 1130, ECG read by Dr. Lira at 1429  Normal sinus rhythm   Rate 72 bpm. AL interval 164 ms. QRS duration 80 ms. QT/QTc 396/433 ms. P-R-T axes 38 45 40.   Done at [facility]    Imaging:  CT Abdomen Pelvis w/o Contrast   Final Result   IMPRESSION:       1.  No acute abnormality, within the limitations of the noncontrast technique.      2.  Chronic severe atrophy of the left kidney with compensatory hypertrophy of the right kidney. No urinary calculi or hydronephrosis.      3.  Pancolonic diverticulosis. No inflamed colonic diverticuli.         Report per radiology    Laboratory:  Labs Ordered and Resulted from Time of ED Arrival to Time of ED Departure   BASIC METABOLIC PANEL - Abnormal       Result Value    Sodium 138      Potassium 4.2      Chloride 103      Carbon Dioxide (CO2) 23      Anion Gap 12      Urea Nitrogen 18.1      Creatinine 1.13      GFR Estimate 78      Calcium 8.6      Glucose 116 (*)    ROUTINE UA WITH MICROSCOPIC REFLEX TO CULTURE - Abnormal    Color Urine Yellow      Appearance Urine Clear      Glucose Urine Negative      Bilirubin Urine Negative      Ketones Urine 20 (*)     Specific Gravity Urine 1.021      Blood Urine Negative      pH Urine 5.5      Protein Albumin Urine 10 (*)     Urobilinogen Urine Normal      Nitrite Urine Negative      Leukocyte Esterase Urine Negative      Mucus Urine Present (*)     RBC Urine <1      WBC Urine 1     CBC WITH PLATELETS AND DIFFERENTIAL - Abnormal    WBC Count 9.3      RBC Count 5.71      Hemoglobin 18.7 (*)     Hematocrit 50.5      MCV 88      MCH 32.7      MCHC 37.0 (*)     RDW 13.3      Platelet Count 239      % Neutrophils 64      % Lymphocytes 29      % Monocytes 6      % Eosinophils 1      % Basophils 0      % Immature Granulocytes 0       NRBCs per 100 WBC 0      Absolute Neutrophils 5.9      Absolute Lymphocytes 2.7      Absolute Monocytes 0.6      Absolute Eosinophils 0.1      Absolute Basophils 0.0      Absolute Immature Granulocytes 0.0      Absolute NRBCs 0.0          Procedures     Emergency Department Course & Assessments:       Interventions:  Medications - No data to display     Assessments, Independent Interpretation, Consult/Discussion of ManagementTests:  ED Course as of 11/20/23 2003   Sun Nov 19, 2023   1420 I obtained history and examined the patient as noted above.     1635 I rechecked the patient and explained findings. We discussed plan for discharge and patient is in agreement with plan.        Social Determinants of Health affecting care:  None    Disposition:  The patient was discharged to home.     Impression & Plan      Medical Decision Making:  Dakotah Zheng is a 53 year old male who presents to the ED for evaluation of left lateral abdominal/flank pain.  On my evaluation he is well-appearing, hemodynamically stable and afebrile.  Abdominal exam is benign without evidence peritonitis or acute surgical emergency.  Broad differential diagnosis is considered.  Ultimately work-up in the emergency department is reassuring pain is most likely musculoskeletal in origin.  No CT evidence for colitis, diverticulitis, bowel obstruction, volvulus, kidney stone, urinary tract obstruction.  Urinalysis negative for UTI.  Renal function is normal.  Patient does appear to be mildly dehydrated with hemoconcentration.  No leukocytosis.  No other acute findings on CT of the abdomen and pelvis.  Incidentally noted is a significantly atrophic left kidney with compensatory enlargement of the right kidney.  Patient was informed of this finding.  He also has diverticulosis without diverticulitis.  I recommended supportive care at home with Tylenol, ibuprofen, lidocaine patches.  Close follow-up with his primary care physician is recommended.  Patient  is agreeable with plan of care and he was discharged in stable condition.      Diagnosis:    ICD-10-CM    1. Left flank pain  R10.9       2. Atrophy of left kidney  N26.1       3. Diverticulosis of large intestine without hemorrhage  K57.30            Discharge Medications:  Discharge Medication List as of 11/19/2023  5:24 PM        START taking these medications    Details   ibuprofen (ADVIL/MOTRIN) 600 MG tablet Take 1 tablet (600 mg) by mouth every 6 hours as needed for other (pain, aches, fever) Take with food., Disp-30 tablet, R-0, E-Prescribe      Lidocaine (LIDOCARE) 4 % Patch Place 1 patch onto the skin daily as needed for moderate pain (musculoskeletal pain) Remove patch after 12 hours. To prevent lidocaine toxicity, patient should be patch free for 12 hrs daily.Disp-15 patch, [id number]            Scribe Disclosure:  I, Sandstone Hitlon, am serving as a scribe at 2:10 PM on 11/19/2023 to document services personally performed by Donald Lira MD based on my observations and the provider's statements to me.    11/19/2023   Donald Lira MD       Historical Data:  ______________________________________________________________________  Medications:    albuterol (PROAIR HFA/PROVENTIL HFA/VENTOLIN HFA) 108 (90 Base) MCG/ACT inhaler  guaiFENesin-codeine (ROBITUSSIN AC) 100-10 MG/5ML solution        Past Medical History:   Past Surgical History:     No past medical history on file. Past Surgical History:   Procedure Laterality Date    TONSILLECTOMY ADULT  06/16/2015      Patient Active Problem List    Diagnosis Date Noted    Pneumonia due to COVID-19 virus 11/15/2020     Priority: Medium          Family History:    family history includes Diabetes in his maternal grandmother and mother. Social History:   reports that he has never smoked. He does not have any smokeless tobacco history on file. He reports that he does not drink alcohol and does not use drugs.     PCP: No Ref-Primary, Physician             Donald Lira MD  11/20/23 2006

## 2023-11-19 NOTE — ED TRIAGE NOTES
Pt sent over from [facility] for further work up.  pt comes in with left flank pain that is a intermittent, twisting pain that wakes him from sleep. Pain started overnight on Wednesday. No N/V/D. Pt took tylenol last at 1930 11/18/23.

## 2025-03-24 NOTE — PLAN OF CARE
Problem: Skin Integrity Alteration  Goal: Skin remains intact with no new/deterioration of wound or pressure injury  Outcome: Monitoring/Evaluating progress  Goal: Participates in wound care activities  Outcome: Monitoring/Evaluating progress      VSS, A&O X4. Pt on 4 L NC oxygen at 96%. Pt SBA. Pt getting Remdesivir and decadron IV. Zithromax and Rocephin D/C'D. Fine crackles on posterior and anterior lower lobes. Pt continues to have dry cough. Plan is to wean off oxygen.  Pt NC fell off his nose around 2100, dropped oxygen to 60's without oxygen, oxygen pumped to 5L for recovery Pt oxygen at 92-93%.